# Patient Record
Sex: MALE | Race: WHITE
[De-identification: names, ages, dates, MRNs, and addresses within clinical notes are randomized per-mention and may not be internally consistent; named-entity substitution may affect disease eponyms.]

---

## 2017-11-10 ENCOUNTER — HOSPITAL ENCOUNTER (EMERGENCY)
Dept: HOSPITAL 47 - EC | Age: 22
Discharge: HOME | End: 2017-11-10
Payer: COMMERCIAL

## 2017-11-10 VITALS
SYSTOLIC BLOOD PRESSURE: 123 MMHG | TEMPERATURE: 97.2 F | HEART RATE: 70 BPM | RESPIRATION RATE: 16 BRPM | DIASTOLIC BLOOD PRESSURE: 82 MMHG

## 2017-11-10 DIAGNOSIS — F17.200: ICD-10-CM

## 2017-11-10 DIAGNOSIS — N45.2: Primary | ICD-10-CM

## 2017-11-10 PROCEDURE — 76870 US EXAM SCROTUM: CPT

## 2017-11-10 PROCEDURE — 93975 VASCULAR STUDY: CPT

## 2017-11-10 PROCEDURE — 96372 THER/PROPH/DIAG INJ SC/IM: CPT

## 2017-11-10 PROCEDURE — 99284 EMERGENCY DEPT VISIT MOD MDM: CPT

## 2017-11-10 NOTE — US
EXAMINATION TYPE: US scrotum with doppler.  Grayscale and color Doppler Duplex imaging performed of t
felix scrotum.

 

DATE OF EXAM: 11/10/2017

 

COMPARISON: NONE

 

CLINICAL HISTORY: Pain. Patient states no injury. Pain right testicle 

 

 

EXAM MEASUREMENTS:

 

TESTICLES:

Right Testicle:  4.9 x 2.4 x 2.7 cm

Left Testicle:  4.6 x 2.5 x 2.6 cm

 

EPIDIDYMIS HEAD:

Right Epididymis:  0.6 cm

Left Epididymis:  1.1 cm, A few cystic areas visualized, largest measuring 0.5 cm  

 

Doppler performed to assess for testicular vascularity; good bilateral color flow and waveforms are s
een.   There is no evidence of testicular torsion.

 

Presence of hydroceles:  No

Presence of varicoceles:  No

 

 

The right testicle is diffusely heterogeneous. Within the right testicle, there is a hypoechoic, hete
rogeneous area visualized in the mid pole measuring 2.7 x 1.2 x 1.8 cm.  

 

 

 

 

 

IMPRESSION: Findings could represent an orchitis on the right, follow-up is recommended to exclude ma
ss. Consider urology consult.

## 2017-11-10 NOTE — ED
General Adult HPI





- General


Chief complaint: Urogenital


Stated complaint: Testicular Pain


Time Seen by Provider: 11/10/17 14:11


Source: patient, RN notes reviewed, old records reviewed


Mode of arrival: ambulatory


Limitations: no limitations





- History of Present Illness


Initial comments: 





Patient is a 22-year-old male who presents emergency room today with a chief 

complaint of right-sided testicular swelling.  Patient does admit that this 

started approximately 5 days ago.  States that increased in pain just 2 days 

ago was seen at [facility] and diagnosed with a testicular torsion.  

He states he was transferred to another [facility].  States that when he was 

there they repeated the ultrasound and diagnosed with with epididymitis.  

States that he was given a few doses of antibiotics there.  He states that he 

eventually left AMA because he was not happy with the nursing staff did not 

feel like he was being treated right.  Patient states still having the pain and 

swelling today.  He denies any other complaints or injuries.  Denies any trauma 

to the area. Patient denies any recent fever, chills, shortness of breath, 

chest pain, back pain, abdominal pain, nausea or vomiting, numbness or tingling

, dysuria or hematuria, constipation or diarrhea, headaches or visual changes, 

or any other complaints.





- Related Data


 Previous Rx's











 Medication  Instructions  Recorded


 


Doxycycline [Vibramycin] 100 mg PO Q12HR #28 capsule 11/10/17











 Allergies











Allergy/AdvReac Type Severity Reaction Status Date / Time


 


No Known Allergies Allergy   Verified 11/10/17 14:07














Review of Systems


ROS Statement: 


Those systems with pertinent positive or pertinent negative responses have been 

documented in the HPI.





ROS Other: All systems not noted in ROS Statement are negative.





Past Medical History


Past Medical History: No Reported History


History of Any Multi-Drug Resistant Organisms: None Reported


Past Surgical History: No Surgical Hx Reported


Past Psychological History: No Psychological Hx Reported


Smoking Status: Current every day smoker


Past Alcohol Use History: Occasional


Past Drug Use History: Marijuana





General Exam





- General Exam Comments


Initial Comments: 





General:  The patient is awake and alert, in no distress, and does not appear 

acutely ill. 


Eye:  Pupils are equal, round and reactive to light, extra-ocular movements are 

intact.  No nystagmus.  There is normal conjunctiva bilaterally.  No signs of 

icterus.  


Ears, nose, mouth and throat:  There are moist mucous membranes and no oral 

lesions. 


Neck:  The neck is supple, there is no tenderness or JVD.  


Cardiovascular:  There is a regular rate and rhythm. No murmur, rub or gallop 

is appreciated.


Respiratory:  Lungs are clear to auscultation, respirations are non-labored, 

breath sounds are equal.  No wheezes, stridor, rales, or rhonchi.


Gastrointestinal:  Soft, non-distended, non-tender abdomen without masses or 

organomegaly noted. There is no rebound or guarding present.  No CVA 

tenderness. Bowel sounds are unremarkable.


Musculoskeletal:  Normal ROM, no tenderness.  Strength 5/5. Sensation intact. 

Pulses equal bilaterally 2+.  


Neurological:  A&O x 3. CN II-XII intact, There are no obvious motor or sensory 

deficits. Coordination appears grossly intact. Speech is normal.


Skin:  Skin is warm and dry and no rashes or lesions are noted. 


Psychiatric:  Cooperative, appropriate mood & affect, normal judgment.


: Circumcised male.  Mild swelling and tenderness to the right testicle. No 

Discharge or drainage.  


Limitations: no limitations





Course





 Vital Signs











  11/10/17





  13:44


 


Temperature 97.4 F L


 


Pulse Rate 71


 


Respiratory 18





Rate 


 


Blood Pressure 158/66


 


O2 Sat by Pulse 100





Oximetry 














Medical Decision Making





- Medical Decision Making





Patient's ultrasound reviewed show evidence for an orchitis.  Patient given 

doses of Rocephin, azithromycin here in emergency room.  Patient will be 

discharged home with doxycycline.  He is advised to follow-up with family 

doctor or neurologist if symptoms are improved.  Advised return here to the 

emergency room for any increased worsening symptoms or any other concerns.





Disposition


Clinical Impression: 


 Orchitis





Disposition: HOME SELF-CARE


Condition: Good


Instructions:  Orchitis (ED)


Additional Instructions: 


Please use medication as discussed.  Please follow-up with urologist/family 

doctor in the next 2 days of symptoms have not improved.  Please return to 

emergency room if the symptoms increase or worsen or for any other concerns.


Prescriptions: 


Doxycycline [Vibramycin] 100 mg PO Q12HR #28 capsule


Referrals: 


Silva Carrizales MD [Primary Care Provider] - 1-2 days


Time of Disposition: 15:33

## 2020-09-07 ENCOUNTER — HOSPITAL ENCOUNTER (EMERGENCY)
Dept: HOSPITAL 47 - EC | Age: 25
Discharge: HOME | End: 2020-09-07
Payer: COMMERCIAL

## 2020-09-07 VITALS
TEMPERATURE: 98.2 F | DIASTOLIC BLOOD PRESSURE: 65 MMHG | RESPIRATION RATE: 18 BRPM | HEART RATE: 92 BPM | SYSTOLIC BLOOD PRESSURE: 100 MMHG

## 2020-09-07 DIAGNOSIS — R07.89: Primary | ICD-10-CM

## 2020-09-07 DIAGNOSIS — F17.210: ICD-10-CM

## 2020-09-07 LAB
ALBUMIN SERPL-MCNC: 4.9 G/DL (ref 3.5–5)
ALP SERPL-CCNC: 94 U/L (ref 38–126)
ALT SERPL-CCNC: 66 U/L (ref 4–49)
ANION GAP SERPL CALC-SCNC: 10 MMOL/L
AST SERPL-CCNC: 44 U/L (ref 17–59)
BASOPHILS # BLD AUTO: 0.1 K/UL (ref 0–0.2)
BASOPHILS NFR BLD AUTO: 1 %
BUN SERPL-SCNC: 6 MG/DL (ref 9–20)
CALCIUM SPEC-MCNC: 10.3 MG/DL (ref 8.4–10.2)
CHLORIDE SERPL-SCNC: 104 MMOL/L (ref 98–107)
CO2 SERPL-SCNC: 27 MMOL/L (ref 22–30)
EOSINOPHIL # BLD AUTO: 0.2 K/UL (ref 0–0.7)
EOSINOPHIL NFR BLD AUTO: 2 %
ERYTHROCYTE [DISTWIDTH] IN BLOOD BY AUTOMATED COUNT: 5.09 M/UL (ref 4.3–5.9)
ERYTHROCYTE [DISTWIDTH] IN BLOOD: 14.2 % (ref 11.5–15.5)
GLUCOSE SERPL-MCNC: 106 MG/DL (ref 74–99)
HCT VFR BLD AUTO: 52.3 % (ref 39–53)
HGB BLD-MCNC: 17.1 GM/DL (ref 13–17.5)
LYMPHOCYTES # SPEC AUTO: 2.7 K/UL (ref 1–4.8)
LYMPHOCYTES NFR SPEC AUTO: 21 %
MCH RBC QN AUTO: 33.6 PG (ref 25–35)
MCHC RBC AUTO-ENTMCNC: 32.7 G/DL (ref 31–37)
MCV RBC AUTO: 102.7 FL (ref 80–100)
MONOCYTES # BLD AUTO: 0.8 K/UL (ref 0–1)
MONOCYTES NFR BLD AUTO: 6 %
NEUTROPHILS # BLD AUTO: 9 K/UL (ref 1.3–7.7)
NEUTROPHILS NFR BLD AUTO: 70 %
PLATELET # BLD AUTO: 320 K/UL (ref 150–450)
POTASSIUM SERPL-SCNC: 4.3 MMOL/L (ref 3.5–5.1)
PROT SERPL-MCNC: 7.9 G/DL (ref 6.3–8.2)
SODIUM SERPL-SCNC: 141 MMOL/L (ref 137–145)
WBC # BLD AUTO: 12.9 K/UL (ref 3.8–10.6)

## 2020-09-07 PROCEDURE — 99285 EMERGENCY DEPT VISIT HI MDM: CPT

## 2020-09-07 PROCEDURE — 71046 X-RAY EXAM CHEST 2 VIEWS: CPT

## 2020-09-07 PROCEDURE — 85379 FIBRIN DEGRADATION QUANT: CPT

## 2020-09-07 PROCEDURE — 84484 ASSAY OF TROPONIN QUANT: CPT

## 2020-09-07 PROCEDURE — 80053 COMPREHEN METABOLIC PANEL: CPT

## 2020-09-07 PROCEDURE — 85025 COMPLETE CBC W/AUTO DIFF WBC: CPT

## 2020-09-07 PROCEDURE — 36415 COLL VENOUS BLD VENIPUNCTURE: CPT

## 2020-09-07 NOTE — ED
General Adult HPI





- General


Chief complaint: Chest Pain


Stated complaint: Chest pain


Time Seen by Provider: 09/07/20 15:04


Source: patient, EMS, RN notes reviewed, old records reviewed


Mode of arrival: EMS


Limitations: no limitations





- History of Present Illness


Initial comments: 


25-year-old male patient.  Seizure evaluation of chest pain shortness of breath.

 Patient reports that about noon he began experiencing some chest tightness and 

will be discharged shortness of breath.  Patient reports he has been having some

coughing too and he does smoke cigarettes.  Patient reports that at upon his 

into the ER at the chest pain has resolved.  He still reports a little bit 

tightness with his breathing. Denies any other complaints at this time.





Systemic: Pt denies fatigue, fever/chills, rash. Pt denies weakness, night 

sweats, weight loss. 


Neuro: Pt denies headache, visual disturbances, syncope or pre-syncope.


HEENT: Pt denies ocular discharge or irritation, otalgia, rhinorrhea, 

pharyngitis or notable lymphadenopathy. 


Cardiopulmonary: Pt denies heart palpitations, dyspnea on exertion.  


Abdominal/GI: Pt denies abdominal pain, n/v/d. 


: Pt denies dysuria, burning w/ urination, frequency/urgency. Denies new onset

urinary or bowel incontinence.  


MSK: Pt denies myalgia, loss of strength or function in extremities. 


Neuro: Pt denies new onset weakness, paresthesias. 








- Related Data


                                Home Medications











 Medication  Instructions  Recorded  Confirmed


 


No Known Home Medications  09/07/20 09/07/20











                                    Allergies











Allergy/AdvReac Type Severity Reaction Status Date / Time


 


No Known Allergies Allergy   Verified 09/07/20 15:25














Review of Systems


ROS Statement: 


Those systems with pertinent positive or pertinent negative responses have been 

documented in the HPI.





ROS Other: All systems not noted in ROS Statement are negative.





Past Medical History


Past Medical History: No Reported History


History of Any Multi-Drug Resistant Organisms: None Reported


Past Surgical History: Orthopedic Surgery


Past Psychological History: ADD/ADHD


Smoking Status: Current every day smoker


Past Alcohol Use History: Abuse, Heavy


Past Drug Use History: Marijuana, Methamphetamine





General Exam





- General Exam Comments


Initial Comments: 





Constitutional: NAD, AOX3, Pt has pleasant affect. 


HEENT: NC/AT, trachea midline, neck supple, no lymphadenopathy. External ears 

appear normal, without discharge. Mucous membranes moist. Eyes PERRLA, EOM 

intact. There is no scleral icterus. No pallor noted. 


Cardiopulmonary: RRR, no murmurs, rubs or gallops, no JVD noted. Lungs CTAB in 

anterior and posterior fields. No peripheral edema. 


Abdominal exam: Abdomen soft and non-distended. Abdomen non-tender to palpation 

in all 4 quadrants. Bowel sounds active in LLQ. No hepatosplenomegaly. No 

ecchymosis


Neuro: CN II-XII intact. No nuchal rigidity. No raccon eyes, no eli sign, no 

hemotympanum. No cervical spinal tenderness. 


MSK: No posterior calf tenderness bilaterally, homans sign negative bilaterally.

Posterior tibialis and radial pulse +2 bilaterally. Sensation intact in upper 

and lower extremities. Full active ROM in upper and lower extremities, 5/5 

stregnth. 








Limitations: no limitations





Course





                                   Vital Signs











  09/07/20 09/07/20





  15:20 16:47


 


Temperature 98.7 F 


 


Pulse Rate 92 101 H


 


Respiratory 18 20





Rate  


 


Blood Pressure 134/81 115/65


 


O2 Sat by Pulse 99 97





Oximetry  














Medical Decision Making





- Medical Decision Making











24-year-old male patient presents to see for evaluation of chest pain shortness 

of breath.  Upon reevaluation patient's symptoms have resolved.  Patient felt 

signs are stable, afebrile.  Physical exam did not display acute pathology.  

Patient declining covid swab.  EKG is nonischemic.  D-dimer negative.  Troponin 

negative.  Chest x-ray negative for acute process.  Patient was discharged with 

follow-up with primary care provider will return to ER if any worsening 

symptoms. Case discussed with Dr. Jc. 

















- Lab Data


Result diagrams: 


                                 09/07/20 16:02





                                 09/07/20 16:02





                                   Lab Results











  09/07/20 09/07/20 09/07/20 Range/Units





  16:02 16:02 16:02 


 


WBC  12.9 H    (3.8-10.6)  k/uL


 


RBC  5.09    (4.30-5.90)  m/uL


 


Hgb  17.1    (13.0-17.5)  gm/dL


 


Hct  52.3    (39.0-53.0)  %


 


MCV  102.7 H    (80.0-100.0)  fL


 


MCH  33.6    (25.0-35.0)  pg


 


MCHC  32.7    (31.0-37.0)  g/dL


 


RDW  14.2    (11.5-15.5)  %


 


Plt Count  320    (150-450)  k/uL


 


Neutrophils %  70    %


 


Lymphocytes %  21    %


 


Monocytes %  6    %


 


Eosinophils %  2    %


 


Basophils %  1    %


 


Neutrophils #  9.0 H    (1.3-7.7)  k/uL


 


Lymphocytes #  2.7    (1.0-4.8)  k/uL


 


Monocytes #  0.8    (0-1.0)  k/uL


 


Eosinophils #  0.2    (0-0.7)  k/uL


 


Basophils #  0.1    (0-0.2)  k/uL


 


Macrocytosis  Slight    


 


D-Dimer   0.34   (<0.60)  mg/L FEU


 


Sodium    141  (137-145)  mmol/L


 


Potassium    4.3  (3.5-5.1)  mmol/L


 


Chloride    104  ()  mmol/L


 


Carbon Dioxide    27  (22-30)  mmol/L


 


Anion Gap    10  mmol/L


 


BUN    6 L  (9-20)  mg/dL


 


Creatinine    0.78  (0.66-1.25)  mg/dL


 


Est GFR (CKD-EPI)AfAm    >90  (>60 ml/min/1.73 sqM)  


 


Est GFR (CKD-EPI)NonAf    >90  (>60 ml/min/1.73 sqM)  


 


Glucose    106 H  (74-99)  mg/dL


 


Calcium    10.3 H  (8.4-10.2)  mg/dL


 


Total Bilirubin    0.7  (0.2-1.3)  mg/dL


 


AST    44  (17-59)  U/L


 


ALT    66 H  (4-49)  U/L


 


Alkaline Phosphatase    94  ()  U/L


 


Troponin I     (0.000-0.034)  ng/mL


 


Total Protein    7.9  (6.3-8.2)  g/dL


 


Albumin    4.9  (3.5-5.0)  g/dL














  09/07/20 Range/Units





  16:02 


 


WBC   (3.8-10.6)  k/uL


 


RBC   (4.30-5.90)  m/uL


 


Hgb   (13.0-17.5)  gm/dL


 


Hct   (39.0-53.0)  %


 


MCV   (80.0-100.0)  fL


 


MCH   (25.0-35.0)  pg


 


MCHC   (31.0-37.0)  g/dL


 


RDW   (11.5-15.5)  %


 


Plt Count   (150-450)  k/uL


 


Neutrophils %   %


 


Lymphocytes %   %


 


Monocytes %   %


 


Eosinophils %   %


 


Basophils %   %


 


Neutrophils #   (1.3-7.7)  k/uL


 


Lymphocytes #   (1.0-4.8)  k/uL


 


Monocytes #   (0-1.0)  k/uL


 


Eosinophils #   (0-0.7)  k/uL


 


Basophils #   (0-0.2)  k/uL


 


Macrocytosis   


 


D-Dimer   (<0.60)  mg/L FEU


 


Sodium   (137-145)  mmol/L


 


Potassium   (3.5-5.1)  mmol/L


 


Chloride   ()  mmol/L


 


Carbon Dioxide   (22-30)  mmol/L


 


Anion Gap   mmol/L


 


BUN   (9-20)  mg/dL


 


Creatinine   (0.66-1.25)  mg/dL


 


Est GFR (CKD-EPI)AfAm   (>60 ml/min/1.73 sqM)  


 


Est GFR (CKD-EPI)NonAf   (>60 ml/min/1.73 sqM)  


 


Glucose   (74-99)  mg/dL


 


Calcium   (8.4-10.2)  mg/dL


 


Total Bilirubin   (0.2-1.3)  mg/dL


 


AST   (17-59)  U/L


 


ALT   (4-49)  U/L


 


Alkaline Phosphatase   ()  U/L


 


Troponin I  <0.012  (0.000-0.034)  ng/mL


 


Total Protein   (6.3-8.2)  g/dL


 


Albumin   (3.5-5.0)  g/dL














- EKG Data


-: EKG Interpreted by Me (and Dr. Kraus )


EKG Comments: 


  Ventricular rate 93, LA interval 160, QRS 94, QT//435. nonspecific T-wa

ve abnormality. No Concern for acute ischemia at this time.








Disposition


Clinical Impression: 


 Atypical chest pain





Disposition: HOME SELF-CARE


Condition: Stable


Instructions (If sedation given, give patient instructions):  Chest Pain (ED)


Additional Instructions: 


  Follow up with primary care provider tomorrow. Return here if any worsening 

symptoms.


Is patient prescribed a controlled substance at d/c from ED?: No


Referrals: 


Silva Carrizales MD [Primary Care Provider] - 1-2 days

## 2020-09-07 NOTE — XR
EXAMINATION TYPE: XR chest 2V

 

DATE OF EXAM: 9/7/2020

 

COMPARISON: None

 

HISTORY: 25 year-old male shortness of breath and pain

 

TECHNIQUE:  PA and lateral views

 

FINDINGS:  

The cardiomediastinal silhouette, aorta, and pulmonary vasculature are within normal limits. Lungs an
d pleural spaces are clear.

 

 

IMPRESSION:  

No acute cardiopulmonary process.

## 2021-01-01 ENCOUNTER — HOSPITAL ENCOUNTER (EMERGENCY)
Dept: HOSPITAL 47 - EC | Age: 26
Discharge: HOME | End: 2021-01-01
Payer: COMMERCIAL

## 2021-01-01 VITALS — SYSTOLIC BLOOD PRESSURE: 119 MMHG | DIASTOLIC BLOOD PRESSURE: 76 MMHG | HEART RATE: 96 BPM

## 2021-01-01 VITALS — TEMPERATURE: 98.3 F | RESPIRATION RATE: 18 BRPM

## 2021-01-01 DIAGNOSIS — F17.200: ICD-10-CM

## 2021-01-01 DIAGNOSIS — L73.1: ICD-10-CM

## 2021-01-01 DIAGNOSIS — F10.10: ICD-10-CM

## 2021-01-01 DIAGNOSIS — B34.9: Primary | ICD-10-CM

## 2021-01-01 PROCEDURE — 71046 X-RAY EXAM CHEST 2 VIEWS: CPT

## 2021-01-01 PROCEDURE — 99285 EMERGENCY DEPT VISIT HI MDM: CPT

## 2021-01-01 RX ADMIN — CEPHALEXIN STA MG: 500 CAPSULE ORAL at 21:19

## 2021-01-01 RX ADMIN — CEPHALEXIN STA EACH: 500 CAPSULE ORAL at 21:19

## 2021-01-01 NOTE — ED
SOB HPI





- General


Chief Complaint: Shortness of Breath


Stated Complaint: SOB


Time Seen by Provider: 01/01/21 20:45


Source: patient


Mode of arrival: EMS


Limitations: no limitations





- History of Present Illness


Initial Comments: 





Patient is a 25-year-old male presenting to the emergency department via EMS 

with complaints of soreness of breath and a cough 2 days.  Patient states his 

cough is dry, nonproductive.  He denies any chest pain, fever, chills, abdominal

pain, nausea or vomiting.  He states he has has concerns for a possible abscess 

developing on the back of his right leg.  He does admit to IV drug use, he is 

every day smoker as well.  He denies history of asthma.  He has no further 

complaints at this time.  Upon arrival to the ER, his vitals are stable.





- Related Data


                                Home Medications











 Medication  Instructions  Recorded  Confirmed


 


No Known Home Medications  09/07/20 01/01/21











                                    Allergies











Allergy/AdvReac Type Severity Reaction Status Date / Time


 


No Known Allergies Allergy   Verified 01/01/21 21:50














Review of Systems


ROS Statement: 


Those systems with pertinent positive or pertinent negative responses have been 

documented in the HPI.





ROS Other: All systems not noted in ROS Statement are negative.





Past Medical History


Past Medical History: No Reported History


History of Any Multi-Drug Resistant Organisms: None Reported


Past Surgical History: Orthopedic Surgery


Past Psychological History: ADD/ADHD


Smoking Status: Current every day smoker


Past Alcohol Use History: Abuse, Heavy


Past Drug Use History: Marijuana, Methamphetamine





General Exam





- General Exam Comments


Initial Comments: 





GENERAL: 


Patient is well-developed and well-nourished.  Patient is nontoxic and in no 

acute distress.





HEAD: 


Atraumatic, normocephalic.





EYES:


Pupils equal round and reactive to light, extraocular movements intact, sclera 

anicteric, conjunctiva are normal.  Eyelids were unremarkable.





ENT: 


TMs normal, nares patent, oropharynx clear without exudates.  Moist mucous 

membranes.





NECK: 


Normal range of motion, supple without lymphadenopathy or JVD.





LUNGS:


Unlabored respirations.  Breath sounds clear to auscultation bilaterally and 

equal.  No wheezes rales or rhonchi.





HEART:


Regular rate and rhythm without murmurs, rubs or gallops.





ABDOMEN: 


Soft, nontender, normoactive bowel sounds.  No guarding, no rebound.  No masses 

appreciated.





: Deferred 





MUSCULOSKELETAL: 


Normal extremities with adequate strength and normal range of motion, no pitting

or edema.  No clubbing or cyanosis.





NEUROLOGICAL: 


Patient is alert and oriented x 3.  Motor and sensory are also intact.  Cranial 

nerves II through XII grossly intact.  Symmetrical smile.  Normal speech, normal

gait.   





PSYCH:


Normal mood, normal affect.





SKIN:


 Warm, Dry, normal turgor, no rashes.  Patient has numerous track marks present 

on his hands and arms.  Patient does have a very small area of erythema on the 

posterior aspect of the right upper leg, this appears to be an ingrown hair, no 

spreading erythema, no signs of an abscess at this time.


Limitations: no limitations





Course


                                   Vital Signs











  01/01/21 01/01/21 01/01/21





  20:39 20:47 22:14


 


Temperature 98.3 F  


 


Pulse Rate 109 H  96


 


Respiratory 18 18 18





Rate   


 


Blood Pressure 147/80  119/76


 


O2 Sat by Pulse 99  98





Oximetry   














Medical Decision Making





- Medical Decision Making





Patient is a 25-year-old male presenting for cough and shortness of breath 2 

days.  Patient does admit to IV drug user.  He is afebrile on arrival, 98% on 

room air, his exam is unremarkable except for some track marks and a possible 

ingrown hair on the posterior aspect of his right leg, no signs of infection or 

abscess seen at this time.  Chest x-ray today shows no acute process.  Patient 

has been resting complains room.  I discussed with patient that we'll give him a

short course of antibiotics for his possible start of mild cellulitis on his 

leg.  First dose given in the ER.  Patient is stable for discharge.  I discussed

the patient that his cough is most likely viral nature.  He can use cough syrup.

 He is in agreement with this plan of care.  Return parameters were discussed 

with the patient he verbalizes understanding.





Disposition


Clinical Impression: 


 Ingrown hair, Cough, Viral illness





Disposition: HOME SELF-CARE


Condition: Stable


Instructions (If sedation given, give patient instructions):  Viral Syndrome 

(ED)


Additional Instructions: 


Please return to the Emergency Department if symptoms worsen or any other 

concerns.


Take antibiotic as prescribed.


Follow up with your PCP.


Is patient prescribed a controlled substance at d/c from ED?: No


Referrals: 


Silva Carrizales MD [Primary Care Provider] - 1-2 days

## 2021-01-01 NOTE — XR
EXAMINATION TYPE: XR chest 2V

 

DATE OF EXAM: 1/1/2021

 

COMPARISON: 9/7/2020

 

HISTORY: Chest pain

 

TECHNIQUE: 2 views

 

FINDINGS: Heart and mediastinum are normal. Lungs are clear. Diaphragm is normal. Bony thorax appears
 normal.

 

IMPRESSION: Normal chest. No change.

## 2023-03-15 ENCOUNTER — HOSPITAL ENCOUNTER (INPATIENT)
Dept: HOSPITAL 47 - EC | Age: 28
LOS: 8 days | Discharge: NURSING, REHAB OR LONG-TERM CARE | DRG: 751 | End: 2023-03-23
Attending: PSYCHIATRY & NEUROLOGY | Admitting: PSYCHIATRY & NEUROLOGY
Payer: COMMERCIAL

## 2023-03-15 DIAGNOSIS — F12.10: ICD-10-CM

## 2023-03-15 DIAGNOSIS — F10.20: ICD-10-CM

## 2023-03-15 DIAGNOSIS — Z59.01: ICD-10-CM

## 2023-03-15 DIAGNOSIS — G47.00: ICD-10-CM

## 2023-03-15 DIAGNOSIS — F17.210: ICD-10-CM

## 2023-03-15 DIAGNOSIS — Z79.899: ICD-10-CM

## 2023-03-15 DIAGNOSIS — F41.9: ICD-10-CM

## 2023-03-15 DIAGNOSIS — F90.9: ICD-10-CM

## 2023-03-15 DIAGNOSIS — Z28.21: ICD-10-CM

## 2023-03-15 DIAGNOSIS — R45.851: ICD-10-CM

## 2023-03-15 DIAGNOSIS — Z20.822: ICD-10-CM

## 2023-03-15 DIAGNOSIS — F15.10: ICD-10-CM

## 2023-03-15 DIAGNOSIS — F33.3: Primary | ICD-10-CM

## 2023-03-15 DIAGNOSIS — F60.0: ICD-10-CM

## 2023-03-15 PROCEDURE — 82075 ASSAY OF BREATH ETHANOL: CPT

## 2023-03-15 PROCEDURE — 84443 ASSAY THYROID STIM HORMONE: CPT

## 2023-03-15 PROCEDURE — 80053 COMPREHEN METABOLIC PANEL: CPT

## 2023-03-15 PROCEDURE — 83036 HEMOGLOBIN GLYCOSYLATED A1C: CPT

## 2023-03-15 PROCEDURE — 87635 SARS-COV-2 COVID-19 AMP PRB: CPT

## 2023-03-15 PROCEDURE — 85025 COMPLETE CBC W/AUTO DIFF WBC: CPT

## 2023-03-15 PROCEDURE — 81003 URINALYSIS AUTO W/O SCOPE: CPT

## 2023-03-15 PROCEDURE — 80306 DRUG TEST PRSMV INSTRMNT: CPT

## 2023-03-15 PROCEDURE — 99285 EMERGENCY DEPT VISIT HI MDM: CPT

## 2023-03-15 SDOH — ECONOMIC STABILITY - HOUSING INSECURITY: SHELTERED HOMELESSNESS: Z59.01

## 2023-03-15 NOTE — ED
General Adult HPI





- General


Source: patient, RN notes reviewed


Mode of arrival: ambulatory


Limitations: no limitations





<Martin Hernandez - Last Filed: 03/15/23 15:56>





- General


Source: patient, RN notes reviewed


Mode of arrival: ambulatory


Limitations: no limitations





<Kennedy Bell - Last Filed: 03/20/23 15:56>





- General


Stated complaint: Psych Eval


Time Seen by Provider: 03/15/23 15:57





- History of Present Illness


Initial comments: 


27-year-old male presents emergency department for psychiatric evaluation.  

Patient is severely depressed, has had thoughts of suicide.  Patient does admit 

to alcohol and drug use.  Patient is brought in by father for evaluation.


 (Martin Hernandez)


Patient is a pleasant 27-year-old male presenting to the emergency department 

for mental health evaluation.  Patient states symptoms have progressed over the 

past week.  Patient has not been eating well.  Patient is drinking alcohol to 

fall asleep.  Patient is having racing thoughts.  Patient is having difficulty 

concentrating.  No suicidal or homicidal thoughts.  Patient is only taking his 

medications bread erratically.  No new physical complaints. (Kennedy Bell)





- Related Data


                                Home Medications











 Medication  Instructions  Recorded  Confirmed


 


Escitalopram [Lexapro] 10 mg PO DAILY 03/15/23 03/16/23











                                    Allergies











Allergy/AdvReac Type Severity Reaction Status Date / Time


 


No Known Allergies Allergy   Verified 03/16/23 00:18














Review of Systems


ROS Other: All systems not noted in ROS Statement are negative.





<Martin Hernandez - Last Filed: 03/15/23 15:56>


ROS Other: All systems not noted in ROS Statement are negative.


Constitutional: Denies: fever


Eyes: Denies: eye pain


ENT: Denies: ear pain


Respiratory: Denies: cough


Cardiovascular: Denies: chest pain


Endocrine: Denies: fatigue


Gastrointestinal: Denies: abdominal pain


Genitourinary: Denies: urgency


Musculoskeletal: Denies: back pain


Skin: Denies: rash


Neurological: Denies: headache





<Kennedy Bell - Last Filed: 03/20/23 15:56>


ROS Statement: 


Those systems with pertinent positive or pertinent negative responses have been 

documented in the HPI.








Past Medical History


Past Medical History: No Reported History


History of Any Multi-Drug Resistant Organisms: None Reported


Past Surgical History: Orthopedic Surgery


Past Psychological History: ADD/ADHD


Smoking Status: Current every day smoker


Past Alcohol Use History: Abuse, Heavy


Past Drug Use History: Marijuana, Methamphetamine





<Martin Hernandez - Last Filed: 03/15/23 15:56>





General Exam





<Martin Hernandez - Last Filed: 03/15/23 15:56>


Limitations: no limitations


General appearance: alert, in no apparent distress, other (Disheveled 

appearance.  Long toenails.  Dirt stained hands.)


Eye exam: Present: normal appearance


Neck exam: Present: normal inspection


Respiratory exam: Present: normal lung sounds bilaterally


Cardiovascular Exam: Present: regular rate, normal rhythm


GI/Abdominal exam: Present: soft.  Absent: tenderness


Extremities exam: Present: normal inspection


Neurological exam: Present: alert


Psychiatric exam: Present: normal affect, normal mood


Skin exam: Present: normal color





<BellKennedy - Last Filed: 03/20/23 15:56>





- General Exam Comments


Initial Comments: 


Visual Physical Exam





Vital signs reviewed





General: Well-appearing, nontoxic, no acute distress.


Head: Normocephalic, atraumatic


Eyes: PERRLA, EOMI


ENT: Airway patent


Chest: Nonlabored breathing


Skin: No visual rash, normal skin tone


Neuro: Alert and oriented 3


Musculoskeletal: No gross abnormalities


 (Martin Hernandez)





Course


                                   Vital Signs











  03/15/23





  16:24


 


Temperature 98.0 F


 


Pulse Rate 95


 


Respiratory 20





Rate 


 


Blood Pressure 151/74


 


O2 Sat by Pulse 99





Oximetry 














Medical Decision Making





- Lab Data


Result diagrams: 


                                 03/16/23 06:52





                                 03/16/23 06:52





<Kennedy Bell - Last Filed: 03/20/23 15:56>





- Lab Data


                                   Lab Results











  03/15/23 03/15/23 03/15/23 Range/Units





  18:36 18:36 21:35 


 


Urine Color   Yellow   


 


Urine Appearance   Clear   (Clear)  


 


Urine pH   7.0   (5.0-8.0)  


 


Ur Specific Gravity   1.021   (1.001-1.035)  


 


Urine Protein   Trace H   (Negative)  


 


Urine Glucose (UA)   Negative   (Negative)  


 


Urine Ketones   Negative   (Negative)  


 


Urine Blood   Negative   (Negative)  


 


Urine Nitrite   Negative   (Negative)  


 


Urine Bilirubin   Negative   (Negative)  


 


Urine Urobilinogen   4.0   (<2.0)  mg/dL


 


Ur Leukocyte Esterase   Negative   (Negative)  


 


Urine Opiates Screen  Detected H    (NotDetected)  


 


Ur Oxycodone Screen  Not Detected    (NotDetected)  


 


Urine Methadone Screen  Not Detected    (NotDetected)  


 


Ur Propoxyphene Screen  Not Detected    (NotDetected)  


 


Ur Barbiturates Screen  Not Detected    (NotDetected)  


 


U Tricyclic Antidepress  Not Detected    (NotDetected)  


 


Ur Phencyclidine Scrn  Not Detected    (NotDetected)  


 


Ur Amphetamines Screen  Not Detected    (NotDetected)  


 


U Methamphetamines Scrn  Not Detected    (NotDetected)  


 


U Benzodiazepines Scrn  Not Detected    (NotDetected)  


 


Urine Cocaine Screen  Not Detected    (NotDetected)  


 


U Marijuana (THC) Screen  Detected H    (NotDetected)  


 


Coronavirus (PCR)    Not Detected  (Not Detectd)  














Disposition





<Martin Hernandez - Last Filed: 03/15/23 15:56>


Is patient prescribed a controlled substance at d/c from ED?: No





<Kennedy Bell - Last Filed: 03/20/23 15:56>


Clinical Impression: 


 Depression





Disposition: ADMITTED AS IP TO THIS HOSP

## 2023-03-16 LAB
ALBUMIN SERPL-MCNC: 4.7 G/DL (ref 3.5–5)
ALP SERPL-CCNC: 84 U/L (ref 38–126)
ALT SERPL-CCNC: 32 U/L (ref 4–49)
ANION GAP SERPL CALC-SCNC: 9 MMOL/L
AST SERPL-CCNC: 25 U/L (ref 17–59)
BASOPHILS # BLD AUTO: 0 K/UL (ref 0–0.2)
BASOPHILS NFR BLD AUTO: 0 %
BUN SERPL-SCNC: 13 MG/DL (ref 9–20)
CALCIUM SPEC-MCNC: 9.7 MG/DL (ref 8.4–10.2)
CHLORIDE SERPL-SCNC: 99 MMOL/L (ref 98–107)
CO2 SERPL-SCNC: 29 MMOL/L (ref 22–30)
EOSINOPHIL # BLD AUTO: 0.2 K/UL (ref 0–0.7)
EOSINOPHIL NFR BLD AUTO: 2 %
ERYTHROCYTE [DISTWIDTH] IN BLOOD BY AUTOMATED COUNT: 5.08 M/UL (ref 4.3–5.9)
ERYTHROCYTE [DISTWIDTH] IN BLOOD: 13.6 % (ref 11.5–15.5)
GLUCOSE SERPL-MCNC: 108 MG/DL (ref 74–99)
HCT VFR BLD AUTO: 48.4 % (ref 39–53)
HGB BLD-MCNC: 16.2 GM/DL (ref 13–17.5)
LYMPHOCYTES # SPEC AUTO: 3 K/UL (ref 1–4.8)
LYMPHOCYTES NFR SPEC AUTO: 37 %
MCH RBC QN AUTO: 31.9 PG (ref 25–35)
MCHC RBC AUTO-ENTMCNC: 33.5 G/DL (ref 31–37)
MCV RBC AUTO: 95.2 FL (ref 80–100)
MONOCYTES # BLD AUTO: 0.5 K/UL (ref 0–1)
MONOCYTES NFR BLD AUTO: 6 %
NEUTROPHILS # BLD AUTO: 4.2 K/UL (ref 1.3–7.7)
NEUTROPHILS NFR BLD AUTO: 52 %
PH UR: 7 [PH] (ref 5–8)
PLATELET # BLD AUTO: 297 K/UL (ref 150–450)
POTASSIUM SERPL-SCNC: 3.9 MMOL/L (ref 3.5–5.1)
PROT SERPL-MCNC: 7.3 G/DL (ref 6.3–8.2)
SODIUM SERPL-SCNC: 137 MMOL/L (ref 137–145)
SP GR UR: 1.02 (ref 1–1.03)
UROBILINOGEN UR QL STRIP: 4 MG/DL (ref ?–2)
WBC # BLD AUTO: 8.1 K/UL (ref 3.8–10.6)

## 2023-03-16 RX ADMIN — NALTREXONE HYDROCHLORIDE SCH MG: 50 TABLET, FILM COATED ORAL at 14:32

## 2023-03-16 RX ADMIN — VENLAFAXINE HYDROCHLORIDE SCH MG: 37.5 CAPSULE, EXTENDED RELEASE ORAL at 14:32

## 2023-03-16 RX ADMIN — NICOTINE SCH PATCH: 14 PATCH, EXTENDED RELEASE TRANSDERMAL at 08:43

## 2023-03-16 NOTE — P.CONS
History of Present Illness





- History of Present Illness





This is a pleasant 27 years old male with no significant past medical history 

who was admitted to the mental health unit for major depressive disorder and 

substance abuse including alcohol, methamphetamines, cannabis.


Patient is walking the hallway with no difficulty.  He denies chest pain or 

dyspnea.  No change in urine or bowel habits.  No fever.  No headache weakness 

numbness or dizziness.


Patient is hemodynamically stable


Labs including CBC, BMP, liver enzymes were unremarkable.  TSH is normal at 1.6.


Urine analysis is negative for infection.


Urine drug screen is positive for urine opioids and marijuana.


Coronavirus not detected

















Review of Systems





Review of systems


CONSTITUTIONAL: No fever, no malaise, no fatigue. 


HEENT: No recent visual problems or hearing problems. Denied any sore throat. 


CARDIOVASCULAR: No  orthopnea, PND, no palpitations, no syncope. 


PULMONARY: No shortness of breath, no cough, no hemoptysis. 


GASTROINTESTINAL: No diarrhea, no nausea, no vomiting, no abdominal pain. 

Normoactive bowel sounds. 


NEUROLOGICAL: No headaches, no weakness, no numbness. 


HEMATOLOGICAL: Denies any bleeding or petechiae. 


GENITOURINARY: Denies any burning micturition, frequency, or urgency. 


MUSCULOSKELETAL/RHEUMATOLOGICAL: Denies any joint pain, swelling, or any muscle 

pain. 


ENDOCRINE: Denies any polyuria or polydipsia.





Past Medical History


Past Medical History: No Reported History


History of Any Multi-Drug Resistant Organisms: None Reported


Past Surgical History: Orthopedic Surgery


Additional Past Surgical History / Comment(s): lt ankle surg plates/screws


Past Anesthesia/Blood Transfusion Reactions: Unable to Obtain


Smoking Status: Current every day smoker





Medications and Allergies


                                Home Medications











 Medication  Instructions  Recorded  Confirmed  Type


 


Escitalopram [Lexapro] 10 mg PO DAILY 03/15/23 03/16/23 History








                                    Allergies











Allergy/AdvReac Type Severity Reaction Status Date / Time


 


No Known Allergies Allergy   Verified 03/16/23 00:18














Physical Exam


Vitals: 


                                   Vital Signs











  Temp Pulse Pulse Resp BP BP Pulse Ox


 


 03/16/23 09:02  98.2 F   88  20   118/55 


 


 03/16/23 00:23  98.2 F   91  15   147/85  97


 


 03/15/23 16:24  98.0 F  95   20  151/74   99








                                Intake and Output











 03/15/23 03/16/23 03/16/23





 22:59 06:59 14:59


 


Other:   


 


  Weight 72.575 kg 70.364 kg 














GENERAL: The patient is alert and oriented x3, not in any acute distress. Well 

developed, well nourished. 


HEENT: Pupils are round and equally reacting to light. EOMI. No scleral icterus.

 No conjunctival pallor. Normocephalic, atraumatic. No pharyngeal erythema. No 

thyromegaly. 


CARDIOVASCULAR: S1 and S2 present. No murmurs, rubs, or gallops. 


PULMONARY: Chest is clear to auscultation, no wheezing or crackles. 


ABDOMEN: Soft, nontender, nondistended, normoactive bowel sounds. No palpable 

organomegaly. 


MUSCULOSKELETAL: No joint swelling or deformity. 


EXTREMITIES: No cyanosis, clubbing, or pedal edema. 


NEUROLOGICAL: Gross neurological examination did not reveal any focal deficits. 


SKIN: No rashes. no petechiae.





Results


CBC & Chem 7: 


                                 03/16/23 06:52





                                 03/16/23 06:52


Labs: 


                  Abnormal Lab Results - Last 24 Hours (Table)











  03/15/23 03/16/23 Range/Units





  18:36 06:52 


 


Glucose   108 H  (74-99)  mg/dL


 


Urine Opiates Screen  Detected H   (NotDetected)  


 


U Marijuana (THC) Screen  Detected H   (NotDetected)  














Assessment and Plan


Assessment: 





Major Depression and other psychotic illnesses


Substance abuse with alcohol, marijuana and methamphetamine








Plan: 





Continue with treatment as per site primary team


Patient was counseled to quit and he agrees


Continue with nicotine patch


Continue with [facility] protocol


We recommend patient follow up with PCP in one week after discharge, patient was

 instructed with the same


Thank you for consulting us Right arm;

## 2023-03-16 NOTE — P.HP
Psychiatric H&P





- .


H&P Date: 03/16/23


History & Physical: 


                                    Allergies











Allergy/AdvReac Type Severity Reaction Status Date / Time


 


No Known Allergies Allergy   Verified 03/16/23 00:18








                                   Vital Signs











Temp  98.2 F   03/16/23 09:02


 


Pulse  88   03/16/23 09:02


 


Resp  20   03/16/23 09:02


 


BP  118/55   03/16/23 09:02


 


Pulse Ox  97   03/16/23 00:23


 


FiO2      








                                 Intake & Output











 03/15/23 03/16/23 03/16/23





 18:59 06:59 18:59


 


Weight 72.575 kg 70.364 kg 








                             Laboratory Last Values











WBC  8.1 k/uL (3.8-10.6)   03/16/23  06:52    


 


RBC  5.08 m/uL (4.30-5.90)   03/16/23  06:52    


 


Hgb  16.2 gm/dL (13.0-17.5)   03/16/23  06:52    


 


Hct  48.4 % (39.0-53.0)   03/16/23  06:52    


 


MCV  95.2 fL (80.0-100.0)   03/16/23  06:52    


 


MCH  31.9 pg (25.0-35.0)   03/16/23  06:52    


 


MCHC  33.5 g/dL (31.0-37.0)   03/16/23  06:52    


 


RDW  13.6 % (11.5-15.5)   03/16/23  06:52    


 


Plt Count  297 k/uL (150-450)   03/16/23  06:52    


 


MPV  7.1   03/16/23  06:52    


 


Neutrophils %  52 %  03/16/23  06:52    


 


Lymphocytes %  37 %  03/16/23  06:52    


 


Monocytes %  6 %  03/16/23  06:52    


 


Eosinophils %  2 %  03/16/23  06:52    


 


Basophils %  0 %  03/16/23  06:52    


 


Neutrophils #  4.2 k/uL (1.3-7.7)   03/16/23  06:52    


 


Lymphocytes #  3.0 k/uL (1.0-4.8)   03/16/23  06:52    


 


Monocytes #  0.5 k/uL (0-1.0)   03/16/23  06:52    


 


Eosinophils #  0.2 k/uL (0-0.7)   03/16/23  06:52    


 


Basophils #  0.0 k/uL (0-0.2)   03/16/23  06:52    


 


Sodium  137 mmol/L (137-145)   03/16/23  06:52    


 


Potassium  3.9 mmol/L (3.5-5.1)   03/16/23  06:52    


 


Chloride  99 mmol/L ()   03/16/23  06:52    


 


Carbon Dioxide  29 mmol/L (22-30)   03/16/23  06:52    


 


Anion Gap  9 mmol/L  03/16/23  06:52    


 


BUN  13 mg/dL (9-20)   03/16/23  06:52    


 


Creatinine  0.99 mg/dL (0.66-1.25)   03/16/23  06:52    


 


Est GFR (CKD-EPI)AfAm  >90  (>60 ml/min/1.73 sqM)   03/16/23  06:52    


 


Est GFR (CKD-EPI)NonAf  >90  (>60 ml/min/1.73 sqM)   03/16/23  06:52    


 


Glucose  108 mg/dL (74-99)  H  03/16/23  06:52    


 


Estimated Ave Glu mg/dL  108   03/16/23  06:52    


 


Hemoglobin A1c  5.4 % (0.0-6.0)   03/16/23  06:52    


 


Calcium  9.7 mg/dL (8.4-10.2)   03/16/23  06:52    


 


Total Bilirubin  0.7 mg/dL (0.2-1.3)   03/16/23  06:52    


 


AST  25 U/L (17-59)   03/16/23  06:52    


 


ALT  32 U/L (4-49)   03/16/23  06:52    


 


Alkaline Phosphatase  84 U/L ()   03/16/23  06:52    


 


Total Protein  7.3 g/dL (6.3-8.2)   03/16/23  06:52    


 


Albumin  4.7 g/dL (3.5-5.0)   03/16/23  06:52    


 


TSH  1.650 mIU/L (0.465-4.680)   03/16/23  06:52    


 


Urine Opiates Screen  Detected  (NotDetected)  H  03/15/23  18:36    


 


Ur Oxycodone Screen  Not Detected  (NotDetected)   03/15/23  18:36    


 


Urine Methadone Screen  Not Detected  (NotDetected)   03/15/23  18:36    


 


Ur Propoxyphene Screen  Not Detected  (NotDetected)   03/15/23  18:36    


 


Ur Barbiturates Screen  Not Detected  (NotDetected)   03/15/23  18:36    


 


U Tricyclic Antidepress  Not Detected  (NotDetected)   03/15/23  18:36    


 


Ur Phencyclidine Scrn  Not Detected  (NotDetected)   03/15/23  18:36    


 


Ur Amphetamines Screen  Not Detected  (NotDetected)   03/15/23  18:36    


 


U Methamphetamines Scrn  Not Detected  (NotDetected)   03/15/23  18:36    


 


U Benzodiazepines Scrn  Not Detected  (NotDetected)   03/15/23  18:36    


 


Urine Cocaine Screen  Not Detected  (NotDetected)   03/15/23  18:36    


 


U Marijuana (THC) Screen  Detected  (NotDetected)  H  03/15/23  18:36    


 


Coronavirus (PCR)  Not Detected  (Not Detectd)   03/15/23  21:35    











03/16/23 10:32


IDENTIFYING DATA: Patient is a 27-year-old  male, currently homeless 

and living out of his father's truck, single, unemployed.





HPI: Patient presented to the hospital [yesterday for a psychiatric evaluation 

was brought in by his father.  Patient apparently had been reporting that he has

 been having an increase in his depression and also suicidal thoughts this past 

week.  Patient also reported increase in alcohol use.  Patient is also reporting

 poor sleep and appetite.  Patient's urine drug screen was positive for opiates 

and THC.  Patient was admitted involuntarily to the mental health unit on 

petition and certificate.  Petition was completed by patient's father and states

 that "waiting to die, fifth dimension, self-proclaimed genius, manic paranoia 

wants to clean himself, won't shower, handle basic everyday function.  Says 

off-the-wall things.  Always negative has no hope.  Over thinks everything, 

won't let go of past".  Patient was seen today by writer laying in his bed and 

agreeable to speak to writer in the office.  Patient appeared to be disheveled 

in appearance, poor hygiene and grooming.  He also appeared to have a 

constricted affect, poor eye contact and soft tone of voice.  He claimed that he

 is feeling "helpless" and states that his concentration is poor.  He claims 

that his "lashing out" and feel like "my life is over".  He claims this is been 

going on for the past couple of months.  States.  A big trigger for him as being

 homeless and staying in his dad's truck.  He claims that he also feels that he 

has little support in feeling hopeless/worthless.  States that he is feeling 

depressed.  Denying any other stressors.  Claims that he is also having anxiety.

  He was having passive suicidal thoughts however no plan.  States that his 

sleep is fair, appetite is poor.  Denying any paranoia however does state that 

"some weird stuff is going on".  He was fairly logical and goal oriented].


Patient denies any current suicidal or homicidal ideations intent or plan.  At 

this time patient denies any auditory or visual hallucinations.  Patient denies 

any flight of ideas racing thoughts and increased in goal directed behavior.  

Patient admits to using alcohol regularly, approximately a pint of liquor a day 

for several years, denies any current withdrawal symptoms or history of DTs.  He

also states that he recently relapsed on methamphetamine about 2 weeks ago.  

Claims that he smokes marijuana regularly and also cigarettes daily.  Denies any

other recreational drug use.





PAST PSYCHIATRIC HISTORY: Patient states that she has a history of depression 

and anxiety and also polysubstance abuse.  He claims that he is previously on 

Lexapro and Wellbutrin has been taking them.  Claims that he was admitted to 

[facility] about one year ago her psychiatric hospitalization.  [Patient denies

 any psychiatric outpatient follow-up.] [Patient denies any history of suicide 

attempts in the past.]





Past Medical History: No Reported History


History of Any Multi-Drug Resistant Organisms: None Reported


Past Surgical History: Orthopedic Surgery


Past Psychological History: ADD/ADHD


Smoking Status: Current every day smoker


Past Alcohol Use History: Abuse, Heavy


Past Drug Use History: Marijuana, Methamphetamine





ALLERGIES: as per EMR





CHEMICAL DEPENDENCY HISTORY: as per HPI





FAMILY PSYCHIATRIC/SUBSTANCE USE HISTORY:  Claims that his father has some form 

of mental illness.





SOCIAL HISTORY: Patient was born and raised in [place] and moved to [place].  

He states that he completed high school.  Claims that he worked as a  

several years ago however is now unemployed.  He claims that he is homeless 

living out of his dad's truck.  States that he is single and has no kids.  He 

claims that he went to shelter once in the past for domestic violence charges in 

2018..





MENTAL STATUS EXAM: 


General Appearance: Patient appears to be thin, tall, disheveled appearance, 

poor hygiene.  stated age is alert, difficult to direct and engage with. Patient

appears to have [poor] hygiene and grooming.


Behavior: Patient is seated without any agitated behavior.  Poor eye contact.


Speech: Patient's speech is [fluent and nonpressured.]  Soft tone.  [employer].


Mood/Affect: Patient reports their mood is [depressed and anxious], affect is 

congruent and constricted. 


Suicidality/Homicidality:  Patient denies having any homicidal ideation intent 

or plan. [Denies any suicidal ideations intent or plan]  


Perceptions: Patient denies any visual hallucinations [and denies any auditory 

hallucinations]


Though content/process: [employer], poverty of content.  Mild paranoia. 


Memory and concentration: AOX3, grossly intact for the purposes of this session.

Can spell "WORLD" backwards


Judgment and insight: [poor]





STRENGTHS/WEAKNESSES: strength is that patient is [resilient]. Weakness is that 

patient [has poor judgment and has poor social support and polysubstance abuse]





INTELLECT: [average]





IMPRESSIONS: 


[]Major depressive disorder severe with psychotic features


homelessness


alcohol use disorder moderate


methamphetamine abuse


cannabis use disorder


nicotine dependence





PLAN: 


-Patient is admitted under [voluntary] status to [facility]HU for stabilization of 

psychiatric symptoms and safety. Patient has signed [adult voluntary form and] 

[medication consent] and is placed in patient's chart. 


-Medications : Will start patient on []zyprexa  2.5 mg qhs for mood 

stabilization/insomnia/psychosis, effexor 37.5 mg daily for mood/anxiety, d/c 

lexapro and wellbutrin. start po naltrexone 50 mg daily for etoh cravings.


-Ativan [and Haldol] PRN for agitation/aggression


[-Started thiamine, MVM for etoh use]


[-CIWA protocol with Ativan PRN for ETOH withdrawal]


[-Patient was counselled on substance abuse and desired to cut back on use]. 


-Patient was informed of the risks, benefits and side effects of the medication 

and patient verbally consented to taking the medications. Patient signed med 

consent form and was placed in chart.


-Internal Medicine consult to perform medical evaluation and physical.


-NRT - [nicotine patch]


-SW on board for discharge planning. Encourage patient to participate in groups 

to work on coping skills. patient took number for and will call access line for 

rehab intake,


03/16/23 13:16





03/16/23 13:22

## 2023-03-17 RX ADMIN — VENLAFAXINE HYDROCHLORIDE SCH MG: 37.5 CAPSULE, EXTENDED RELEASE ORAL at 08:42

## 2023-03-17 RX ADMIN — NALTREXONE HYDROCHLORIDE SCH MG: 50 TABLET, FILM COATED ORAL at 08:41

## 2023-03-17 RX ADMIN — NICOTINE SCH PATCH: 14 PATCH, EXTENDED RELEASE TRANSDERMAL at 08:41

## 2023-03-17 NOTE — P.PN
Progress Note - Text


Progress Note Date: 03/17/23





Interval History:


Patient was seen bedside and agreeable to speak with writer in the office.  Matt palacios states that his current mood is "catatonic depression ".  Patient reports 

ideas of reference that has been ongoing for the past one year.  He states that 

he is "on the way to hell " and has messages conveyed this on TV and billboards.

 He says he mentioned this to other doctors who called it "referential 

delusion".  He states that he sees "same face with different people "which 

guides him to believe that "the sun will burn me soon ".  Patient does not 

display real insight into substance use but does say he will call the rehab 

intake line today.  He denies overt symptoms of alcohol withdrawal currently but

instructed to request Ativan if he is feeling anxious or tremulous.  He was 

agreeable with the Zyprexa being changed to Invega.  At this time patient denies

any suicidal or homicidal ideations, intent or plan. Patient denies any 

auditory, visual hallucinations and denies any paranoia or delusions. Patient 

denies any side effects from the medications and has been compliant with meds. 





Mental Status Exam:


General Appearance: Patient appears to be thin, tall, disheveled appearance, 

poor hygiene.  stated age is alert, difficult to direct and engage with. Patient

appears to have poor hygiene and grooming.


Behavior: Patient is seated without any agitated behavior.  Poor eye contact.


Speech: Patient's speech is fluent and nonpressured.  Soft tone.  [place].


Mood/Affect: Patient reports their mood is depressed and anxious, affect is 

congruent and constricted. 


Suicidality/Homicidality:  Patient denies having any homicidal ideation intent 

or plan. Denies any suicidal ideations intent or plan 


Perceptions: Patient denies any visual hallucinations [and denies any auditory 

hallucinations]


Though content/process: [place], poverty of content.  Mild paranoia. Ideas of 

reference


Memory and concentration: AOX3, grossly intact for the purposes of this session.

Can spell "WORLD" backwards


Judgment and insight: poor





Assessment


Major depressive disorder severe with psychotic features


alcohol use disorder moderate, currently in withdrawal


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


cannabis use disorder


nicotine dependence





PLAN: 


-Patient is admitted under [voluntary] status to [facility] for stabilization of 

psychiatric symptoms and safety. Patient has signed [adult voluntary form and] 

[medication consent] and is placed in patient's chart. 


-Medications : 


Change Zyprexa to Invega 6 mg qHS with potential plan for TREJO.


Continue effexor 37.5 mg daily for mood/anxiety, 


Continue naltrexone 50 mg daily for etoh cravings.


-Ativan and Haldol PRN for agitation/aggression


-thiamine, MVM for etoh use


-CIWA protocol with Ativan PRN for ETOH withdrawal] VSS


-Patient was counselled on substance abuse and desired to cut back on use. 


-Patient was informed of the risks, benefits and side effects of the medication 

and patient verbally consented to taking the medications. Patient signed med 

consent form and was placed in chart.


-Internal Medicine consult to perform medical evaluation and physical.


-NRT - [nicotine patch]


-SW on board for discharge planning. Encourage patient to participate in groups 

to work on coping skills. patient took number for and will call access line for 

rehab intake. Patient currently homeless.

## 2023-03-18 RX ADMIN — NALTREXONE HYDROCHLORIDE SCH MG: 50 TABLET, FILM COATED ORAL at 08:50

## 2023-03-18 RX ADMIN — VENLAFAXINE HYDROCHLORIDE SCH MG: 75 CAPSULE, EXTENDED RELEASE ORAL at 08:50

## 2023-03-18 RX ADMIN — THERA TABS SCH EACH: TAB at 08:50

## 2023-03-18 RX ADMIN — Medication SCH MG: at 08:50

## 2023-03-18 RX ADMIN — NICOTINE SCH PATCH: 14 PATCH, EXTENDED RELEASE TRANSDERMAL at 08:50

## 2023-03-18 NOTE — P.PN
Progress Note - Text


Progress Note Date: 03/18/23





Interval History:


Patient was seen bedside and agreeable to speak with writer.  Patient states t

hat his current mood is "foggy".  He asks if he can be placed on Adderall 

because "that's the only thing that helps me".  Patient displays little insight 

into the substance use leading to psychotic symptoms. Patient does not display 

any real insight into substance use and did not call the rehab intake line 

yesterday.  He continues to have negative thoughts regarding himself.  He denies

overt symptoms of alcohol withdrawal currently.  He reports fair appetite and 

good sleep last night.  At this time patient denies any suicidal or homicidal 

ideation, intent or plan. Patient denies any auditory, visual hallucinations and

denies any paranoia or delusions. Patient denies any side effects from the 

medications and has been compliant with meds. 





Mental Status Exam:


General Appearance: Patient appears to be thin, tall, disheveled appearance, 

poor hygiene.  stated age is alert, difficult to direct and engage with. Patient

appears to have poor hygiene and grooming.


Behavior: Patient is laying down without any agitated behavior.  Fair eye 

contact.


Speech: Patient's speech is fluent and nonpressured.  Soft tone.  [place].


Mood/Affect: Patient reports their mood is depressed and anxious, affect is 

congruent and constricted. 


Suicidality/Homicidality:  Patient denies having any homicidal ideation intent 

or plan. Denies any suicidal ideation intent or plan 


Perceptions: Patient denies any visual hallucinations and denies any auditory 

hallucinations


Though content/process: [place], poverty of content.  Mild paranoia. Ideas of 

reference


Memory and concentration: AOX3, grossly intact for the purposes of this session.


Judgment and insight: poor





Assessment


Major depressive disorder severe with psychotic features


alcohol use disorder moderate, currently in withdrawal


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


cannabis use disorder


nicotine dependence





PLAN: 


-Patient is admitted under [voluntary] status to [facility] for stabilization of 

psychiatric symptoms and safety. Patient has signed [adult voluntary form and] 

[medication consent] and is placed in patient's chart. 


-Medications : 


Increase Invega to 9 mg qHS with potential plan for [facility].


Continue effexor 37.5 mg daily for mood/anxiety, 


Continue naltrexone 50 mg daily for etoh cravings.


-Ativan and Haldol PRN for agitation/aggression


-thiamine, MVM for etoh use


-CIWA protocol with Ativan PRN for ETOH withdrawal] VSS


-Patient was counselled on substance abuse and desired to cut back on use. 


-Patient was informed of the risks, benefits and side effects of the medication 

and patient verbally consented to taking the medications. Patient signed med 

consent form and was placed in chart.


-Internal Medicine consult to perform medical evaluation and physical.


-NRT - [nicotine patch]


-SW on board for discharge planning. Encourage patient to participate in groups 

to work on coping skills. patient took number for and will call access line for 

rehab intake. Patient currently homeless.

## 2023-03-19 RX ADMIN — VENLAFAXINE HYDROCHLORIDE SCH MG: 75 CAPSULE, EXTENDED RELEASE ORAL at 09:03

## 2023-03-19 RX ADMIN — Medication SCH MG: at 09:03

## 2023-03-19 RX ADMIN — NALTREXONE HYDROCHLORIDE SCH MG: 50 TABLET, FILM COATED ORAL at 09:03

## 2023-03-19 RX ADMIN — NICOTINE SCH PATCH: 14 PATCH, EXTENDED RELEASE TRANSDERMAL at 09:04

## 2023-03-19 RX ADMIN — THERA TABS SCH EACH: TAB at 09:03

## 2023-03-19 NOTE — P.PN
Progress Note - Text


Progress Note Date: 03/19/23





Interval History:


Patient was seen bedside and agreeable to speak with writer.  Patient states t

hat his current mood is "fine".  Patient says that he no longer wants to be on 

Invega because it is causing him to "feel cloudy at her know how to better 

explain ".  He is currently refusing this medication.  He states that he does 

not want the medication which calmed him down.  Discussed Abilify and patient 

was agreeable with this.  He continues to wonder if he can be placed on Adderall

as a maintenance treatment for methamphetamine use but explained that this is 

not a possibility.  He says that he would like to go to [facility] in [place] 

following discharge for substance use.  Patient is not as delusional today.  He 

denies overt symptoms of alcohol withdrawal currently.  He reports fair appetite

and good sleep last night.  At this time patient denies any suicidal or 

homicidal ideation, intent or plan. Patient denies any auditory, visual 

hallucinations and denies any paranoia or delusions. Patient denies any side 

effects from the medications and has been compliant with meds. 





Mental Status Exam:


General Appearance: Patient appears to be thin, tall, disheveled appearance, 

poor hygiene.  stated age is alert, difficult to direct and engage with. Patient

appears to have poor hygiene and grooming.


Behavior: Patient is seated without any agitated behavior.  Fair eye contact.


Speech: Patient's speech is fluent and nonpressured.  Soft tone.  [place].


Mood/Affect: Patient reports their mood is "Fine", affect is congruent and 

constricted. 


Suicidality/Homicidality:  Patient denies having any homicidal ideation intent 

or plan. Denies any suicidal ideation intent or plan 


Perceptions: Patient denies any visual hallucinations and denies any auditory 

hallucinations


Though content/process: [place], poverty of content.  Less paranoia. No ideas 

of reference


Memory and concentration: AOX3, grossly intact for the purposes of this session.


Judgment and insight: poor





Assessment


Major depressive disorder severe with psychotic features


alcohol use disorder moderate, currently in withdrawal


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


cannabis use disorder


nicotine dependence





PLAN: 


-Patient is admitted under [voluntary] status to [facility] for stabilization of 

psychiatric symptoms and safety. Patient has signed [adult voluntary form and] 

[medication consent] and is placed in patient's chart. 


-Medications : 


Stop Invega as patient is refusing this due to feeling "cloudy" on it


Start Abilify 5 mg daily


Increase effexor to 75 mg daily for mood/anxiety, 


Continue naltrexone 50 mg daily for etoh cravings.


-Ativan and Haldol PRN for agitation/aggression


-thiamine, MVM for etoh use


-Stop CIWA protocol - scoring 0


-Patient was counselled on substance abuse and desired to cut back on use. 


-Patient was informed of the risks, benefits and side effects of the medication 

and patient verbally consented to taking the medications. Patient signed med 

consent form and was placed in chart.


-Internal Medicine consult to perform medical evaluation and physical.


-NRT - [nicotine patch]


-SW on board for discharge planning. Encourage patient to participate in groups 

to work on coping skills. patient took number for and will call access line for 

rehab intake but now saying he would like to go to [facility] in [place]. 

Patient currently homeless.

## 2023-03-20 RX ADMIN — THERA TABS SCH EACH: TAB at 10:14

## 2023-03-20 RX ADMIN — Medication SCH MG: at 10:14

## 2023-03-20 RX ADMIN — NALTREXONE HYDROCHLORIDE SCH MG: 50 TABLET, FILM COATED ORAL at 10:14

## 2023-03-20 RX ADMIN — VENLAFAXINE HYDROCHLORIDE SCH MG: 150 CAPSULE, EXTENDED RELEASE ORAL at 10:14

## 2023-03-20 RX ADMIN — VENLAFAXINE HYDROCHLORIDE SCH: 75 CAPSULE, EXTENDED RELEASE ORAL at 10:22

## 2023-03-20 RX ADMIN — NICOTINE SCH PATCH: 14 PATCH, EXTENDED RELEASE TRANSDERMAL at 10:14

## 2023-03-20 NOTE — P.PN
Progress Note - Text


Progress Note Date: 03/20/23





Interval History:


Patient was seen laying in his bed today and was directable and agreeable to s

peak with writer in the office.  Patient appears to continue to be disheveled in

appearance, mild improvement in hygiene and grooming.  Continues to have a 

constricted affect however this is improved.  He continues to have poor eye 

contact.  He states that his mood is "a bit better" however continues to endorse

depression.  He is not endorsing paranoia at this time or any hallucinations.  

He claims that he is willing to go to rehab and spoke about a place in [place] 

however does not know if he got in her not.  States that she had a difficult 

time sleeping last night and required Haldol and Ativan prn.  He asked questions

about his medications and we discussed other alternatives which she is agreeable

to continue on with treatment.  Mildly improving insight and judgment.  At this 

time patient denies any suicidal or homical ideations, intent or plan. Patient 

denies any auditory, visual hallucinations and denies any paranoia or delusions.

Patient denies any side effects from the medications and has been compliant with

meds. 





Mental Status Exam:


General Appearance: Patient appears to be thin, tall, disheveled appearance, 

mildly improving hygiene. stated age is alert, difficult to direct and engage 

with. Patient appears to have mildly improving hygiene and grooming.


Behavior: Patient is seated without any agitated behavior.  Poor eye contact, 

mildly improving


Speech: Patient's speech is fluent and nonpressured. [place].


Mood/Affect: Patient reports their mood is depressed and anxious,  improving 

mildly, affect is congruent 


Suicidality/Homicidality:  Patient denies having any homicidal ideation intent 

or plan. Denies any suicidal ideations intent or plan  


Perceptions: Patient denies any visual hallucinations and denies any auditory 

hallucinations


Though content/process: [place], poverty of content. no paranoia. more goal 

oriented.


Memory and concentration: AOX3, grossly intact for the purposes of this session


Judgment and insight: poor, improving mildly





IMPRESSIONS: 


Major depressive disorder severe with psychotic features


homelessness


alcohol use disorder moderate


methamphetamine abuse


cannabis use disorder


nicotine dependence





Plan:


-Patient continues to meet criteria for inpatient psychiatric admission for 

symptom stabilization and safety. Patient has signed adult voluntary form and 

medication consent and was placed in patient's chart.


-Medications: Start Abilify 5 mg daily for mood adjunct/psychosis, increased 

Effexor to 150 mg daily for mood/anxiety, added trazodone 50 mg daily at bedtime

for mood/insomnia.  Continue with naltrexone 50 mg daily for alcohol cravings.


-When necessary Ativan and Haldol for agitation/aggression.


-NRT - nicotine patch


-SW on board for discharge planning.  Encouraged the patient to participate in 

milieu.  encouraged patient to call access line for intake date for rehab.  

likely discharge in 1-2  days.

## 2023-03-21 RX ADMIN — Medication SCH MG: at 08:54

## 2023-03-21 RX ADMIN — NICOTINE SCH PATCH: 14 PATCH, EXTENDED RELEASE TRANSDERMAL at 08:54

## 2023-03-21 RX ADMIN — NALTREXONE HYDROCHLORIDE SCH MG: 50 TABLET, FILM COATED ORAL at 08:54

## 2023-03-21 RX ADMIN — THERA TABS SCH EACH: TAB at 08:53

## 2023-03-21 RX ADMIN — VENLAFAXINE HYDROCHLORIDE SCH MG: 150 CAPSULE, EXTENDED RELEASE ORAL at 08:54

## 2023-03-22 VITALS
DIASTOLIC BLOOD PRESSURE: 55 MMHG | SYSTOLIC BLOOD PRESSURE: 115 MMHG | TEMPERATURE: 97.1 F | RESPIRATION RATE: 14 BRPM | HEART RATE: 92 BPM

## 2023-03-22 RX ADMIN — NALTREXONE HYDROCHLORIDE SCH MG: 50 TABLET, FILM COATED ORAL at 09:06

## 2023-03-22 RX ADMIN — VENLAFAXINE HYDROCHLORIDE SCH MG: 75 CAPSULE, EXTENDED RELEASE ORAL at 09:05

## 2023-03-22 RX ADMIN — THERA TABS SCH EACH: TAB at 09:06

## 2023-03-22 RX ADMIN — NICOTINE SCH PATCH: 14 PATCH, EXTENDED RELEASE TRANSDERMAL at 09:05

## 2023-03-22 RX ADMIN — Medication SCH MG: at 09:06

## 2023-03-22 NOTE — P.PN
Progress Note - Text


Progress Note Date: 03/22/23





Interval History:


Patient was seen laying in his bed today and was directable and agreeable to s

peak with writer in the office. He continues to appear to be mildly dicheveled 

in appearance. He continues to have improving eye contact. He continues to have 

a poverty of content and constricted affect. claims that his mood is improving 

and anxiety is improving. He states that he is able to sleep fairly last night  

however he did take an ativan last night. Continues to be fairly superficial  

and states that he wants to go torehab and called the access line yesterday. He 

claims that he is going to some groups. Claims as a fair appetite.  Mildly 

improving insight and judgment.  At this time patient denies any suicidal or 

homical ideations, intent or plan. Patient denies any auditory, visual 

hallucinations and denies any paranoia or delusions. Patient denies any side 

effects from the medications and has been compliant with meds. 





Mental Status Exam:


General Appearance: Patient appears to be thin, tall, disheveled appearance, 

mildly improving hygiene. stated age is alert, difficult to direct and engage 

with. Patient appears to have mildly improving hygiene and grooming.


Behavior: Patient is seated without any agitated behavior. eye contact mildly 

improving


Speech: Patient's speech is fluent and nonpressured. improving


Mood/Affect: Patient reports their mood is improving mildly, affect is congruent

and constricted


Suicidality/Homicidality:  Patient denies having any homicidal ideation intent 

or plan. Denies any suicidal ideations intent or plan  


Perceptions: Patient denies any visual hallucinations and denies any auditory 

hallucinations


Though content/process: [place], poverty of content. no paranoia. more goal 

oriented


Memory and concentration: AOX3, grossly intact for the purposes of this session


Judgment and insight: improving mildly





IMPRESSIONS: 


Major depressive disorder severe with psychotic features


homelessness


alcohol use disorder moderate


methamphetamine abuse


cannabis use disorder


nicotine dependence





Plan:


-Patient continues to meet criteria for inpatient psychiatric admission for 

symptom stabilization and safety. Patient has signed adult voluntary form and 

medication consent and was placed in patient's chart.


-Medications: Abilify 5 mg daily for mood adjunct/psychosis, Effexor 225 mg 

daily for mood/anxiety, trazodone 50 mg daily at bedtime for mood/insomnia. 

Continue with naltrexone 50 mg daily for alcohol cravings.


-When necessary Ativan and Haldol for agitation/aggression.


-NRT - nicotine patch


-SW on board for discharge planning. Encouraged the patient to participate in 

milieu. awaiting intake date at  for rehab. likely discharge tomorrow.

## 2023-03-23 RX ADMIN — VENLAFAXINE HYDROCHLORIDE SCH MG: 75 CAPSULE, EXTENDED RELEASE ORAL at 09:38

## 2023-03-23 RX ADMIN — NICOTINE SCH PATCH: 14 PATCH, EXTENDED RELEASE TRANSDERMAL at 09:38

## 2023-03-23 RX ADMIN — Medication SCH MG: at 09:38

## 2023-03-23 RX ADMIN — NALTREXONE HYDROCHLORIDE SCH MG: 50 TABLET, FILM COATED ORAL at 09:38

## 2023-03-23 RX ADMIN — THERA TABS SCH EACH: TAB at 09:38

## 2023-03-23 NOTE — P.DS
Providers


Date of admission: 


03/15/23 22:42





Expected date of discharge: 03/23/23


Attending physician: 


Ren Gutierrez MD





Consults: 





                                        





03/15/23 22:53


Consult Physician Routine 


   Consulting Provider: [facility]


   Consult Reason/Comments: H&P


   Do you want consulting provider notified?: Yes











Primary care physician: 


Silva Carrizales








- Discharge Diagnosis(es)


(1) Major depressive disorder, recurrent, severe with psychotic features


Current Visit: Yes   Status: Acute   Priority: High   





(2) Homelessness


Current Visit: Yes   Status: Acute   Priority: Medium   





(3) Alcohol use disorder, moderate, dependence


Current Visit: Yes   Status: Acute   Priority: High   





(4) Cannabis use disorder


Current Visit: Yes   Status: Acute   Priority: Medium   





(5) Methamphetamine abuse


Current Visit: Yes   Status: Acute   Priority: High   





(6) Nicotine dependence


Current Visit: Yes   Status: Acute   Priority: Low   


Hospital Course: 





Admission HPI:


Admission note was completed by Dr Corrigan "Patient is a 27-year-old  

male, currently homeless and living out of his father's truck, single, 

unemployed. Patient presented to the hospital yesterday for a psychiatric 

evaluation was brought in by his father.  Patient apparently had been reporting 

that he has been having an increase in his depression and also suicidal thoughts

this past week.  Patient also reported increase in alcohol use.  Patient is also

reporting poor sleep and appetite.  Patient's urine drug screen was positive for

opiates and THC.  Patient was admitted involuntarily to the mental health unit 

on petition and certificate.  Petition was completed by patient's father and 

states that "waiting to die, fifth dimension, self-proclaimed genius, manic 

paranoia wants to clean himself, won't shower, handle basic everyday function.  

Says off-the-wall things.  Always negative has no hope.  Over thinks everything,

won't let go of past".  Patient was seen today by writer laying in his bed and 

agreeable to speak to writer in the office.  Patient appeared to be disheveled 

in appearance, poor hygiene and grooming.  He also appeared to have a 

constricted affect, poor eye contact and soft tone of voice.  He claimed that he

is feeling "helpless" and states that his concentration is poor.  He claims that

his "lashing out" and feel like "my life is over".  He claims this is been going

on for the past couple of months.  States.  A big trigger for him as being 

homeless and staying in his dad's truck.  He claims that he also feels that he 

has little support in feeling hopeless/worthless.  States that he is feeling 

depressed.  Denying any other stressors.  Claims that he is also having anxiety.

 He was having passive suicidal thoughts however no plan.  States that his sleep

is fair, appetite is poor.  Denying any paranoia however does state that "some 

weird stuff is going on".  He was fairly logical and goal oriented.


Patient denies any current suicidal or homicidal ideations intent or plan.  At 

this time patient denies any auditory or visual hallucinations.  Patient denies 

any flight of ideas racing thoughts and increased in goal directed behavior.  

Patient admits to using alcohol regularly, approximately a pint of liquor a day 

for several years, denies any current withdrawal symptoms or history of DTs.  He

also states that he recently relapsed on methamphetamine about 2 weeks ago.  

Claims that he smokes marijuana regularly and also cigarettes daily.  Denies any

other recreational drug use."





Hospital course:


Upon admission to the unit patient was directable and agreeable to commence 

treatment and signed adult voluntary form. Patient mainly kept to himself 

however with treatment he improved and got along well with other patients on the

unit and followed unit protocol.  Patient was compliant with the medications and

denied any side effects throughout hospital course.  Patient was started on 

Effexor and increased to a dose of 225 mg daily for mood/anxiety, Abilify 5 mg 

daily for mood adjunct/psychosis, trazodone 50 mg daily at bedtime for mood/

insomnia, naltrexone by mouth 50 mg daily for alcohol cravings..  Patient spoke 

of his stressors and engaged in therapy both group and individual.  Patient was 

also seen by medical team for history and physical exam.  Throughout the course 

of the hospitalization patient gradually improved with regards to mood, anxiety,

hygiene, psychosis, sleep and returned back to their baseline level of function

ing. On the day of discharge patient denied any suicidal or homicidal ideations 

intent or plan denied any auditory or visual hallucinations. Patient endorsed 

wanting to live for his  future and his sobriety. The patient denied any access 

to guns or weapons.  Patient denied any paranoia and did not endorse any 

delusions.  Patient does have a significant history of substance abuse and was 

counseled on abstaining from all substances including alcohol and marijuana.  

Patient ended up calling the access line for an intake date at [place], 

patient's intake date is set for 3/27 at 10 AM.  Patient was also counseled on 

the medications and need for regular compliance and was encouraged to follow-up 

with their outpatient appointment for mental health and also for primary care.  

Patient will be discharged to a shelter today and given instructions to go to 

[facility] for shuttle pickup to [place] rehab on 3/27. 





Mental status exam:


General Appearance: Patient appears to be thin, stated age is alert, pleasant, 

and cooperative. Patient is in no acute distress and has improved hygiene and 

grooming 


Behavior: Patient is calmly seated without any agitated behavior.


Speech: Patient's speech is fluent and nonpressured. 


Mood/Affect: Patient reports their mood is "alright", affect is congruent. 


Suicidality/Homicidality:  Patient denies having any suicidal or homicidal 

ideation intent or plan.  


Perceptions: Patient denies any auditory or visual hallucinations.  


Though content/process: There is no evidence of any delusional thought content 

and thought process is linear and goal-directed. concrete.


Memory and concentration: AOX3, grossly intact for the purposes of this session.

Can spell "WORLD" backwards correctly.


Judgment and insight: chronically poor, however has improved with guarded 

prognosis





Impression:


Major depressive disorder severe with psychotic features


Homelessness


Alcohol use disorder moderate


Methamphetamine abuse


Cannabis use disorder


Nicotine dependence





Plan:


-Continue with discharge today as patient has improved and stabilized 

psychiatrically and is not currently an imminent threat to himself and/or 

others. Patient will remain at chronically elevated risk for harm to self and/or

others due to his impulsivity and polysubstance abuse.


-Continue medications: Abilify 5 mg daily for mood adjunct/psychosis, Effexor 20

and 25 mg daily for mood/anxiety, trazodone 50 mg daily at bedtime for 

mood/insomnia, no trucks own by mouth 50 mg daily for local cravings.


-Patient was counseled on the need for medication compliance and appropriate 

follow-up at mental health and also primary care for medical issues.  Patient 

verbalized understanding and agreed.


-Social work to help with disposition to shelter today and patient has a intake 

appointment at [place] on 3/27 at 10 AM and will take a shuttle from [facility] to

get there.  Social work also to arrange for patients follow up appointments with

[facility] for psychiatric care along with follow up with primary care provider.


-Patient counseled on abstaining from recreational drugs and marijuana and 

alcohol. Was informed/educated on the adverse effects on their physical and 

mental health. Patient verbally agreed and understood. 


-Patient was instructed to return to the hospital or seek immediate medical care

if their psychiatric or medical symptoms do worsen or reoccur.








                                    Allergies











Allergy/AdvReac Type Severity Reaction Status Date / Time


 


No Known Allergies Allergy   Verified 03/16/23 00:18











                               Laboratory Results











WBC  8.1 k/uL (3.8-10.6)   03/16/23  06:52    


 


RBC  5.08 m/uL (4.30-5.90)   03/16/23  06:52    


 


Hgb  16.2 gm/dL (13.0-17.5)   03/16/23  06:52    


 


Hct  48.4 % (39.0-53.0)   03/16/23  06:52    


 


MCV  95.2 fL (80.0-100.0)   03/16/23  06:52    


 


MCH  31.9 pg (25.0-35.0)   03/16/23  06:52    


 


MCHC  33.5 g/dL (31.0-37.0)   03/16/23  06:52    


 


RDW  13.6 % (11.5-15.5)   03/16/23  06:52    


 


Plt Count  297 k/uL (150-450)   03/16/23  06:52    


 


MPV  7.1   03/16/23  06:52    


 


Neutrophils %  52 %  03/16/23  06:52    


 


Lymphocytes %  37 %  03/16/23  06:52    


 


Monocytes %  6 %  03/16/23  06:52    


 


Eosinophils %  2 %  03/16/23  06:52    


 


Basophils %  0 %  03/16/23  06:52    


 


Neutrophils #  4.2 k/uL (1.3-7.7)   03/16/23  06:52    


 


Lymphocytes #  3.0 k/uL (1.0-4.8)   03/16/23  06:52    


 


Monocytes #  0.5 k/uL (0-1.0)   03/16/23  06:52    


 


Eosinophils #  0.2 k/uL (0-0.7)   03/16/23  06:52    


 


Basophils #  0.0 k/uL (0-0.2)   03/16/23  06:52    


 


Sodium  137 mmol/L (137-145)   03/16/23  06:52    


 


Potassium  3.9 mmol/L (3.5-5.1)   03/16/23  06:52    


 


Chloride  99 mmol/L ()   03/16/23  06:52    


 


Carbon Dioxide  29 mmol/L (22-30)   03/16/23  06:52    


 


Anion Gap  9 mmol/L  03/16/23  06:52    


 


BUN  13 mg/dL (9-20)   03/16/23  06:52    


 


Creatinine  0.99 mg/dL (0.66-1.25)   03/16/23  06:52    


 


Est GFR (CKD-EPI)AfAm  >90  (>60 ml/min/1.73 sqM)   03/16/23  06:52    


 


Est GFR (CKD-EPI)NonAf  >90  (>60 ml/min/1.73 sqM)   03/16/23  06:52    


 


Glucose  108 mg/dL (74-99)  H  03/16/23  06:52    


 


Estimated Ave Glu mg/dL  108   03/16/23  06:52    


 


Hemoglobin A1c  5.4 % (0.0-6.0)   03/16/23  06:52    


 


Calcium  9.7 mg/dL (8.4-10.2)   03/16/23  06:52    


 


Total Bilirubin  0.7 mg/dL (0.2-1.3)   03/16/23  06:52    


 


AST  25 U/L (17-59)   03/16/23  06:52    


 


ALT  32 U/L (4-49)   03/16/23  06:52    


 


Alkaline Phosphatase  84 U/L ()   03/16/23  06:52    


 


Total Protein  7.3 g/dL (6.3-8.2)   03/16/23  06:52    


 


Albumin  4.7 g/dL (3.5-5.0)   03/16/23  06:52    


 


TSH  1.650 mIU/L (0.465-4.680)   03/16/23  06:52    


 


Urine Color  Yellow   03/15/23  18:36    


 


Urine Appearance  Clear  (Clear)   03/15/23  18:36    


 


Urine pH  7.0  (5.0-8.0)   03/15/23  18:36    


 


Ur Specific Gravity  1.021  (1.001-1.035)   03/15/23  18:36    


 


Urine Protein  Trace  (Negative)  H  03/15/23  18:36    


 


Urine Glucose (UA)  Negative  (Negative)   03/15/23  18:36    


 


Urine Ketones  Negative  (Negative)   03/15/23  18:36    


 


Urine Blood  Negative  (Negative)   03/15/23  18:36    


 


Urine Nitrite  Negative  (Negative)   03/15/23  18:36    


 


Urine Bilirubin  Negative  (Negative)   03/15/23  18:36    


 


Urine Urobilinogen  4.0 mg/dL (<2.0)   03/15/23  18:36    


 


Ur Leukocyte Esterase  Negative  (Negative)   03/15/23  18:36    


 


Urine Opiates Screen  Detected  (NotDetected)  H  03/15/23  18:36    


 


Ur Oxycodone Screen  Not Detected  (NotDetected)   03/15/23  18:36    


 


Urine Methadone Screen  Not Detected  (NotDetected)   03/15/23  18:36    


 


Ur Propoxyphene Screen  Not Detected  (NotDetected)   03/15/23  18:36    


 


Ur Barbiturates Screen  Not Detected  (NotDetected)   03/15/23  18:36    


 


U Tricyclic Antidepress  Not Detected  (NotDetected)   03/15/23  18:36    


 


Ur Phencyclidine Scrn  Not Detected  (NotDetected)   03/15/23  18:36    


 


Ur Amphetamines Screen  Not Detected  (NotDetected)   03/15/23  18:36    


 


U Methamphetamines Scrn  Not Detected  (NotDetected)   03/15/23  18:36    


 


U Benzodiazepines Scrn  Not Detected  (NotDetected)   03/15/23  18:36    


 


Urine Cocaine Screen  Not Detected  (NotDetected)   03/15/23  18:36    


 


U Marijuana (THC) Screen  Detected  (NotDetected)  H  03/15/23  18:36    


 


Coronavirus (PCR)  Not Detected  (Not Detectd)   03/15/23  21:35    











                                   Vital Signs











Temp  97.1 F L  03/22/23 06:52


 


Pulse  92   03/22/23 06:52


 


Resp  14   03/22/23 06:52


 


BP  115/55   03/22/23 06:52


 


Pulse Ox  97   03/21/23 06:44


 


FiO2      











Patient Condition at Discharge: Stable





Plan - Discharge Summary


Discharge Rx Participant: Yes


New Discharge Prescriptions: 


New


   ARIPiprazole [Abilify] 5 mg PO DAILY 30 Days #30 tab


   traZODone HCL [Desyrel] 50 mg PO HS 30 Days #30 tab


   Venlafaxine HCl [Effexor XR] 225 mg PO DAILY 30 Days #30 tab


   Nicotine 14Mg/24Hr Patch [Habitrol] 1 patch TRANSDERM DAILY 14 Days #14 patch


   Naltrexone HCl [Revia] 50 mg PO DAILY 30 Days #30 tab


   Thiamine [Vitamin B-1] 100 mg PO DAILY 30 Days #30 tab


   Multivitamins, Thera [Multivitamin (formulary)] 1 each PO DAILY 30 Days #30 

tab





Discontinued


   Escitalopram [Lexapro] 10 mg PO DAILY


Discharge Medication List





ARIPiprazole [Abilify] 5 mg PO DAILY 30 Days #30 tab 03/23/23 [Rx]


Multivitamins, Thera [Multivitamin (formulary)] 1 each PO DAILY 30 Days #30 tab 

03/23/23 [Rx]


Naltrexone HCl [Revia] 50 mg PO DAILY 30 Days #30 tab 03/23/23 [Rx]


Nicotine 14Mg/24Hr Patch [Habitrol] 1 patch TRANSDERM DAILY 14 Days #14 patch 03 /23/23 [Rx]


Thiamine [Vitamin B-1] 100 mg PO DAILY 30 Days #30 tab 03/23/23 [Rx]


Venlafaxine HCl [Effexor XR] 225 mg PO DAILY 30 Days #30 tab 03/23/23 [Rx]


traZODone HCL [Desyrel] 50 mg PO HS 30 Days #30 tab 03/23/23 [Rx]








Follow up Appointment(s)/Referral(s): 


[facility] [Outside] - 03/27/23 10:00 am (intake)


Silva Carrizales MD [Primary Care Provider] - 1-2 days


Activity/Diet/Wound Care/Special Instructions: 


Avoid the use of street drugs and alcohol.  Take all medications as prescribed. 

When you are in need of refills on your medications, please contact your medical

provider and/or outpatient psychiatrist to have this done.  Please go to 

scheduled outpatient appointments for aftercare treatment.  If symptoms return 

or become worse, call the crisis line at [phone] and/or go to the nearest

emergency room for evaluation.  


Discharge Disposition: OTHER INSTITUTION NOT DEFINED

## 2023-03-25 ENCOUNTER — HOSPITAL ENCOUNTER (EMERGENCY)
Dept: HOSPITAL 47 - EC | Age: 28
LOS: 1 days | Discharge: HOME | End: 2023-03-26
Payer: COMMERCIAL

## 2023-03-25 VITALS
RESPIRATION RATE: 20 BRPM | HEART RATE: 108 BPM | SYSTOLIC BLOOD PRESSURE: 168 MMHG | DIASTOLIC BLOOD PRESSURE: 98 MMHG | TEMPERATURE: 98.7 F

## 2023-03-25 DIAGNOSIS — F12.90: ICD-10-CM

## 2023-03-25 DIAGNOSIS — F17.200: ICD-10-CM

## 2023-03-25 DIAGNOSIS — F31.9: ICD-10-CM

## 2023-03-25 DIAGNOSIS — F19.10: Primary | ICD-10-CM

## 2023-03-25 PROCEDURE — 80053 COMPREHEN METABOLIC PANEL: CPT

## 2023-03-25 PROCEDURE — 80320 DRUG SCREEN QUANTALCOHOLS: CPT

## 2023-03-25 PROCEDURE — 80179 DRUG ASSAY SALICYLATE: CPT

## 2023-03-25 PROCEDURE — 83605 ASSAY OF LACTIC ACID: CPT

## 2023-03-25 PROCEDURE — 36415 COLL VENOUS BLD VENIPUNCTURE: CPT

## 2023-03-25 PROCEDURE — 96375 TX/PRO/DX INJ NEW DRUG ADDON: CPT

## 2023-03-25 PROCEDURE — 85025 COMPLETE CBC W/AUTO DIFF WBC: CPT

## 2023-03-25 PROCEDURE — 82550 ASSAY OF CK (CPK): CPT

## 2023-03-25 PROCEDURE — 99284 EMERGENCY DEPT VISIT MOD MDM: CPT

## 2023-03-25 PROCEDURE — 81001 URINALYSIS AUTO W/SCOPE: CPT

## 2023-03-25 PROCEDURE — 93005 ELECTROCARDIOGRAM TRACING: CPT

## 2023-03-25 PROCEDURE — 80143 DRUG ASSAY ACETAMINOPHEN: CPT

## 2023-03-25 PROCEDURE — 80306 DRUG TEST PRSMV INSTRMNT: CPT

## 2023-03-25 PROCEDURE — 96374 THER/PROPH/DIAG INJ IV PUSH: CPT

## 2023-03-25 PROCEDURE — 96361 HYDRATE IV INFUSION ADD-ON: CPT

## 2023-03-26 LAB
ALBUMIN SERPL-MCNC: 4.8 G/DL (ref 3.5–5)
ALP SERPL-CCNC: 67 U/L (ref 38–126)
ALT SERPL-CCNC: 40 U/L (ref 4–49)
ANION GAP SERPL CALC-SCNC: 11 MMOL/L
APAP SPEC-MCNC: <10 UG/ML
AST SERPL-CCNC: 32 U/L (ref 17–59)
BASOPHILS # BLD AUTO: 0 K/UL (ref 0–0.2)
BASOPHILS NFR BLD AUTO: 0 %
BUN SERPL-SCNC: 9 MG/DL (ref 9–20)
CALCIUM SPEC-MCNC: 9.5 MG/DL (ref 8.4–10.2)
CHLORIDE SERPL-SCNC: 106 MMOL/L (ref 98–107)
CK SERPL-CCNC: 225 U/L (ref 55–170)
CO2 SERPL-SCNC: 24 MMOL/L (ref 22–30)
EOSINOPHIL # BLD AUTO: 0.1 K/UL (ref 0–0.7)
EOSINOPHIL NFR BLD AUTO: 1 %
ERYTHROCYTE [DISTWIDTH] IN BLOOD BY AUTOMATED COUNT: 4.49 M/UL (ref 4.3–5.9)
ERYTHROCYTE [DISTWIDTH] IN BLOOD: 13.8 % (ref 11.5–15.5)
GLUCOSE SERPL-MCNC: 140 MG/DL (ref 74–99)
HCT VFR BLD AUTO: 42.3 % (ref 39–53)
HGB BLD-MCNC: 14 GM/DL (ref 13–17.5)
LYMPHOCYTES # SPEC AUTO: 2.1 K/UL (ref 1–4.8)
LYMPHOCYTES NFR SPEC AUTO: 19 %
MCH RBC QN AUTO: 31.2 PG (ref 25–35)
MCHC RBC AUTO-ENTMCNC: 33.1 G/DL (ref 31–37)
MCV RBC AUTO: 94.2 FL (ref 80–100)
MONOCYTES # BLD AUTO: 0.4 K/UL (ref 0–1)
MONOCYTES NFR BLD AUTO: 4 %
NEUTROPHILS # BLD AUTO: 8 K/UL (ref 1.3–7.7)
NEUTROPHILS NFR BLD AUTO: 75 %
PH UR: 7 [PH] (ref 5–8)
PLATELET # BLD AUTO: 282 K/UL (ref 150–450)
POTASSIUM SERPL-SCNC: 3.6 MMOL/L (ref 3.5–5.1)
PROT SERPL-MCNC: 7.3 G/DL (ref 6.3–8.2)
PROT UR QL: (no result)
RBC UR QL: <1 /HPF (ref 0–5)
SALICYLATES SERPL-MCNC: <1 MG/DL
SODIUM SERPL-SCNC: 141 MMOL/L (ref 137–145)
SP GR UR: 1.01 (ref 1–1.03)
UROBILINOGEN UR QL STRIP: <2 MG/DL (ref ?–2)
WBC # BLD AUTO: 10.7 K/UL (ref 3.8–10.6)
WBC # UR AUTO: 3 /HPF (ref 0–5)
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
## 2023-09-10 ENCOUNTER — HOSPITAL ENCOUNTER (INPATIENT)
Dept: HOSPITAL 47 - EC | Age: 28
LOS: 5 days | Discharge: HOME | DRG: 753 | End: 2023-09-15
Attending: PSYCHIATRY & NEUROLOGY | Admitting: PSYCHIATRY & NEUROLOGY
Payer: MEDICAID

## 2023-09-10 DIAGNOSIS — Z91.199: ICD-10-CM

## 2023-09-10 DIAGNOSIS — F14.11: ICD-10-CM

## 2023-09-10 DIAGNOSIS — Z56.0: ICD-10-CM

## 2023-09-10 DIAGNOSIS — F15.11: ICD-10-CM

## 2023-09-10 DIAGNOSIS — Z71.6: ICD-10-CM

## 2023-09-10 DIAGNOSIS — F31.5: Primary | ICD-10-CM

## 2023-09-10 DIAGNOSIS — G47.00: ICD-10-CM

## 2023-09-10 DIAGNOSIS — T50.906A: ICD-10-CM

## 2023-09-10 DIAGNOSIS — F11.11: ICD-10-CM

## 2023-09-10 DIAGNOSIS — F17.210: ICD-10-CM

## 2023-09-10 DIAGNOSIS — Z91.128: ICD-10-CM

## 2023-09-10 DIAGNOSIS — E03.9: ICD-10-CM

## 2023-09-10 DIAGNOSIS — F10.239: ICD-10-CM

## 2023-09-10 DIAGNOSIS — Z20.822: ICD-10-CM

## 2023-09-10 DIAGNOSIS — F90.9: ICD-10-CM

## 2023-09-10 DIAGNOSIS — F12.10: ICD-10-CM

## 2023-09-10 PROCEDURE — 80053 COMPREHEN METABOLIC PANEL: CPT

## 2023-09-10 PROCEDURE — 85025 COMPLETE CBC W/AUTO DIFF WBC: CPT

## 2023-09-10 PROCEDURE — 80061 LIPID PANEL: CPT

## 2023-09-10 PROCEDURE — 83036 HEMOGLOBIN GLYCOSYLATED A1C: CPT

## 2023-09-10 PROCEDURE — 87635 SARS-COV-2 COVID-19 AMP PRB: CPT

## 2023-09-10 PROCEDURE — 84443 ASSAY THYROID STIM HORMONE: CPT

## 2023-09-10 PROCEDURE — 80306 DRUG TEST PRSMV INSTRMNT: CPT

## 2023-09-10 PROCEDURE — 82075 ASSAY OF BREATH ETHANOL: CPT

## 2023-09-10 PROCEDURE — 99285 EMERGENCY DEPT VISIT HI MDM: CPT

## 2023-09-10 SDOH — ECONOMIC STABILITY - INCOME SECURITY: UNEMPLOYMENT, UNSPECIFIED: Z56.0
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
## 2023-09-13 LAB
ALBUMIN SERPL-MCNC: 4.3 G/DL (ref 3.5–5)
ALP SERPL-CCNC: 65 U/L (ref 38–126)
ALT SERPL-CCNC: 42 U/L (ref 4–49)
ANION GAP SERPL CALC-SCNC: 6 MMOL/L
AST SERPL-CCNC: 33 U/L (ref 17–59)
BASOPHILS # BLD AUTO: 0 K/UL (ref 0–0.2)
BASOPHILS NFR BLD AUTO: 0 %
BUN SERPL-SCNC: 10 MG/DL (ref 9–20)
CALCIUM SPEC-MCNC: 9.8 MG/DL (ref 8.4–10.2)
CHLORIDE SERPL-SCNC: 109 MMOL/L (ref 98–107)
CHOLEST SERPL-MCNC: 188 MG/DL (ref 0–200)
CO2 SERPL-SCNC: 28 MMOL/L (ref 22–30)
EOSINOPHIL # BLD AUTO: 0.1 K/UL (ref 0–0.7)
EOSINOPHIL NFR BLD AUTO: 2 %
ERYTHROCYTE [DISTWIDTH] IN BLOOD BY AUTOMATED COUNT: 4.96 M/UL (ref 4.3–5.9)
ERYTHROCYTE [DISTWIDTH] IN BLOOD: 14.5 % (ref 11.5–15.5)
GLUCOSE SERPL-MCNC: 107 MG/DL (ref 74–99)
HCT VFR BLD AUTO: 51.2 % (ref 39–53)
HDLC SERPL-MCNC: 35.7 MG/DL (ref 40–60)
HGB BLD-MCNC: 16.6 GM/DL (ref 13–17.5)
LDLC SERPL CALC-MCNC: 114.3 MG/DL (ref 0–131)
LYMPHOCYTES # SPEC AUTO: 2.5 K/UL (ref 1–4.8)
LYMPHOCYTES NFR SPEC AUTO: 31 %
MCH RBC QN AUTO: 33.6 PG (ref 25–35)
MCHC RBC AUTO-ENTMCNC: 32.5 G/DL (ref 31–37)
MCV RBC AUTO: 103.3 FL (ref 80–100)
MONOCYTES # BLD AUTO: 0.5 K/UL (ref 0–1)
MONOCYTES NFR BLD AUTO: 6 %
NEUTROPHILS # BLD AUTO: 4.8 K/UL (ref 1.3–7.7)
NEUTROPHILS NFR BLD AUTO: 59 %
PLATELET # BLD AUTO: 247 K/UL (ref 150–450)
POTASSIUM SERPL-SCNC: 4.2 MMOL/L (ref 3.5–5.1)
PROT SERPL-MCNC: 7.1 G/DL (ref 6.3–8.2)
SODIUM SERPL-SCNC: 143 MMOL/L (ref 137–145)
TRIGL SERPL-MCNC: 190 MG/DL (ref 0–149)
VLDLC SERPL CALC-MCNC: 38 MG/DL (ref 5–40)
WBC # BLD AUTO: 8.1 K/UL (ref 3.8–10.6)

## 2023-09-13 RX ADMIN — NALTREXONE HYDROCHLORIDE SCH MG: 50 TABLET, FILM COATED ORAL at 11:58

## 2023-09-13 RX ADMIN — NICOTINE SCH PATCH: 14 PATCH, EXTENDED RELEASE TRANSDERMAL at 08:46

## 2023-09-13 RX ADMIN — VENLAFAXINE HYDROCHLORIDE SCH MG: 75 CAPSULE, EXTENDED RELEASE ORAL at 11:58
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
## 2023-09-14 VITALS — RESPIRATION RATE: 18 BRPM

## 2023-09-14 RX ADMIN — VENLAFAXINE HYDROCHLORIDE SCH MG: 75 CAPSULE, EXTENDED RELEASE ORAL at 09:22

## 2023-09-14 RX ADMIN — NALTREXONE HYDROCHLORIDE SCH MG: 50 TABLET, FILM COATED ORAL at 09:22

## 2023-09-14 RX ADMIN — NICOTINE SCH: 14 PATCH, EXTENDED RELEASE TRANSDERMAL at 11:09

## 2023-09-14 NOTE — P.PN
Progress Note - Text


Progress Note Date: 09/14/23





Interval history:


Patient was seen today wandering the hallways and was agreeable to be seen by 

writer in the office. Patient patient states that he is doing a bit better 

today, has been taking his medications.  He is not reporting any side effects or

problems with it at this time.  He claims that he is willing to go to rehab and 

will take the number and give them a call today for an intake today.  He 

continues to be somewhat superficial with writer however this is improving.  He 

states that he realizes the importance of follow-up, he was somewhat focused on 

discharge today.  He claims that he was able sleep fairly last night.  Has been 

going to minimal groups.  Insight and judgment have been mildly improving.  

Appetite is fair at this time.  He is denying any depression or anxiety at this 

time.  Denying any suicidal or homicidal ideations intent or plan.  Denying any 

auditory or visual hallucinations.





Mental status examination:


General Appearance: Patient appears to be thin, tall, disheveled appearance, 

improving hygiene.  stated age is alert, difficult to direct and engage with. 

Patient appears to have improving hygiene and grooming.


Behavior: Patient is seated without any agitated behavior.  Improving eye 

contact.  More cooperative today.


Speech: Patient's speech is fluent and nonpressured.  Soft tone.  [place], 

improving


Mood/Affect: Patient reports their mood is improving, affect is congruent 


Suicidality/Homicidality:  Patient denies having any homicidal ideation intent 

or plan. Denies any suicidal ideations intent or plan  


Perceptions: Patient denies any visual hallucinations and denies any auditory 

hallucinations


Though content/process: [place], poverty of content.  Future oriented today.


Memory and concentration: AOX3, grossly intact for the purposes of this session


Judgment and insight: imProving mildly





IMPRESSIONS: 


Major depressive disorder severe with psychotic features


non compliance with medications


alcohol use disorder moderate


hx of methamphetamine abuse


cannabis use disorder


nicotine dependence





PLAN: 


-Patient is admitted under involuntary status on an acitve treatment order to 

[facility] for stabilization of psychiatric symptoms and safety. 


-Medications : effexor 75 mg daily for mood/anxiety, po naltrexone 50 mg daily 

for etoh cravings. trazodone 50 mg qhs for insomnia/mood.


-Ativan and Haldol PRN for agitation/aggression


-CIWA protocol with Ativan PRN for ETOH withdrawal


-NRT - nicotine patch


-SW on board for discharge planning. Encourage patient to participate in groups 

to work on coping skills. patient will make call to access line today and likely

discharge tomorrow back to friends house with [facility] oupt appointment

## 2023-09-15 VITALS — HEART RATE: 73 BPM | DIASTOLIC BLOOD PRESSURE: 72 MMHG | SYSTOLIC BLOOD PRESSURE: 113 MMHG | TEMPERATURE: 97.8 F

## 2023-09-15 RX ADMIN — NALTREXONE HYDROCHLORIDE SCH MG: 50 TABLET, FILM COATED ORAL at 09:03

## 2023-09-15 RX ADMIN — NICOTINE SCH: 14 PATCH, EXTENDED RELEASE TRANSDERMAL at 09:03

## 2023-09-15 RX ADMIN — VENLAFAXINE HYDROCHLORIDE SCH MG: 75 CAPSULE, EXTENDED RELEASE ORAL at 09:03
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
